# Patient Record
Sex: FEMALE | Race: WHITE | Employment: OTHER | URBAN - METROPOLITAN AREA
[De-identification: names, ages, dates, MRNs, and addresses within clinical notes are randomized per-mention and may not be internally consistent; named-entity substitution may affect disease eponyms.]

---

## 2019-07-15 LAB
CHOLESTEROL, TOTAL: NORMAL
CHOLESTEROL/HDL RATIO: NORMAL
HDLC SERPL-MCNC: NORMAL MG/DL
LDL CHOLESTEROL CALCULATED: NORMAL
TRIGL SERPL-MCNC: NORMAL MG/DL
VITAMIN D 25-HYDROXY: NORMAL
VITAMIN D2, 25 HYDROXY: NORMAL
VITAMIN D3,25 HYDROXY: NORMAL
VLDLC SERPL CALC-MCNC: NORMAL MG/DL

## 2019-08-12 LAB
ALBUMIN SERPL-MCNC: NORMAL G/DL
ALP BLD-CCNC: NORMAL U/L
ALT SERPL-CCNC: NORMAL U/L
ANION GAP SERPL CALCULATED.3IONS-SCNC: NORMAL MMOL/L
AST SERPL-CCNC: NORMAL U/L
BASOPHILS ABSOLUTE: NORMAL
BASOPHILS RELATIVE PERCENT: NORMAL
BILIRUB SERPL-MCNC: NORMAL MG/DL
BILIRUBIN, URINE: NORMAL
BLOOD, URINE: NORMAL
BUN BLDV-MCNC: NORMAL MG/DL
CALCIUM SERPL-MCNC: NORMAL MG/DL
CHLORIDE BLD-SCNC: NORMAL MMOL/L
CLARITY: NORMAL
CO2: NORMAL
COLOR: NORMAL
CREAT SERPL-MCNC: NORMAL MG/DL
EOSINOPHILS ABSOLUTE: NORMAL
EOSINOPHILS RELATIVE PERCENT: NORMAL
GFR CALCULATED: NORMAL
GLUCOSE BLD-MCNC: NORMAL MG/DL
GLUCOSE URINE: NORMAL
HCT VFR BLD CALC: NORMAL %
HEMOGLOBIN: NORMAL
KETONES, URINE: NORMAL
LEUKOCYTE ESTERASE, URINE: NORMAL
LYMPHOCYTES ABSOLUTE: NORMAL
LYMPHOCYTES RELATIVE PERCENT: NORMAL
MCH RBC QN AUTO: NORMAL PG
MCHC RBC AUTO-ENTMCNC: NORMAL G/DL
MCV RBC AUTO: NORMAL FL
MONOCYTES ABSOLUTE: NORMAL
MONOCYTES RELATIVE PERCENT: NORMAL
NEUTROPHILS ABSOLUTE: NORMAL
NEUTROPHILS RELATIVE PERCENT: NORMAL
NITRITE, URINE: NORMAL
PH UA: NORMAL
PLATELET # BLD: NORMAL 10*3/UL
PMV BLD AUTO: NORMAL FL
POTASSIUM SERPL-SCNC: NORMAL MMOL/L
PROTEIN UA: NORMAL
RBC # BLD: NORMAL 10*6/UL
SODIUM BLD-SCNC: NORMAL MMOL/L
SPECIFIC GRAVITY, URINE: NORMAL
T4 FREE: NORMAL
TOTAL PROTEIN: NORMAL
UROBILINOGEN, URINE: NORMAL
WBC # BLD: NORMAL 10*3/UL

## 2019-08-13 LAB — TSH SERPL DL<=0.05 MIU/L-ACNC: NORMAL M[IU]/L

## 2019-08-19 ENCOUNTER — TELEPHONE (OUTPATIENT)
Dept: HEMATOLOGY | Age: 79
End: 2019-08-19

## 2019-08-19 DIAGNOSIS — C78.7 METASTATIC CANCER TO LIVER (HCC): Primary | ICD-10-CM

## 2019-08-19 RX ORDER — SODIUM CHLORIDE 0.9 % (FLUSH) 0.9 %
10 SYRINGE (ML) INJECTION PRN
Status: CANCELLED | OUTPATIENT
Start: 2019-08-19

## 2019-08-20 ENCOUNTER — HOSPITAL ENCOUNTER (OUTPATIENT)
Dept: CT IMAGING | Age: 79
Discharge: HOME | End: 2019-08-22
Payer: MEDICARE

## 2019-08-20 VITALS
HEIGHT: 66 IN | TEMPERATURE: 97.5 F | DIASTOLIC BLOOD PRESSURE: 74 MMHG | OXYGEN SATURATION: 97 % | BODY MASS INDEX: 27.32 KG/M2 | SYSTOLIC BLOOD PRESSURE: 128 MMHG | RESPIRATION RATE: 16 BRPM | WEIGHT: 170 LBS | HEART RATE: 76 BPM

## 2019-08-20 DIAGNOSIS — C78.7 METASTATIC CANCER TO LIVER (HCC): ICD-10-CM

## 2019-08-20 LAB
HCT VFR BLD CALC: 39 % (ref 34–48)
HEMOGLOBIN: 12.4 G/DL (ref 11.5–15.5)
INR BLD: 1.1
MCH RBC QN AUTO: 30.2 PG (ref 26–35)
MCHC RBC AUTO-ENTMCNC: 31.8 % (ref 32–34.5)
MCV RBC AUTO: 94.9 FL (ref 80–99.9)
PDW BLD-RTO: 14 FL (ref 11.5–15)
PLATELET # BLD: 187 E9/L (ref 130–450)
PMV BLD AUTO: 10.3 FL (ref 7–12)
PROTHROMBIN TIME: 12.4 SEC (ref 9.3–12.4)
RBC # BLD: 4.11 E12/L (ref 3.5–5.5)
REASON FOR REJECTION: NORMAL
REJECTED TEST: NORMAL
WBC # BLD: 6 E9/L (ref 4.5–11.5)

## 2019-08-20 PROCEDURE — 88342 IMHCHEM/IMCYTCHM 1ST ANTB: CPT

## 2019-08-20 PROCEDURE — 6370000000 HC RX 637 (ALT 250 FOR IP): Performed by: RADIOLOGY

## 2019-08-20 PROCEDURE — 2709999900 CT GUIDED NEEDLE PLACEMENT

## 2019-08-20 PROCEDURE — 85610 PROTHROMBIN TIME: CPT

## 2019-08-20 PROCEDURE — 88341 IMHCHEM/IMCYTCHM EA ADD ANTB: CPT

## 2019-08-20 PROCEDURE — 85027 COMPLETE CBC AUTOMATED: CPT

## 2019-08-20 PROCEDURE — 2500000003 HC RX 250 WO HCPCS: Performed by: RADIOLOGY

## 2019-08-20 PROCEDURE — 36415 COLL VENOUS BLD VENIPUNCTURE: CPT

## 2019-08-20 PROCEDURE — 7100000010 HC PHASE II RECOVERY - FIRST 15 MIN

## 2019-08-20 PROCEDURE — 47000 NEEDLE BIOPSY OF LIVER PERQ: CPT

## 2019-08-20 PROCEDURE — 7100000011 HC PHASE II RECOVERY - ADDTL 15 MIN

## 2019-08-20 PROCEDURE — 2709999900 CT NEEDLE BIOPSY LIVER PERCUTANEOUS

## 2019-08-20 PROCEDURE — 6360000002 HC RX W HCPCS: Performed by: RADIOLOGY

## 2019-08-20 PROCEDURE — 88307 TISSUE EXAM BY PATHOLOGIST: CPT

## 2019-08-20 RX ORDER — OXYCODONE HYDROCHLORIDE 5 MG/1
5 TABLET ORAL ONCE
Status: COMPLETED | OUTPATIENT
Start: 2019-08-20 | End: 2019-08-20

## 2019-08-20 RX ORDER — MIDAZOLAM HYDROCHLORIDE 1 MG/ML
1 INJECTION INTRAMUSCULAR; INTRAVENOUS ONCE
Status: COMPLETED | OUTPATIENT
Start: 2019-08-20 | End: 2019-08-20

## 2019-08-20 RX ORDER — SODIUM CHLORIDE 0.9 % (FLUSH) 0.9 %
10 SYRINGE (ML) INJECTION PRN
Status: DISCONTINUED | OUTPATIENT
Start: 2019-08-20 | End: 2019-08-23 | Stop reason: HOSPADM

## 2019-08-20 RX ORDER — FENTANYL CITRATE 50 UG/ML
50 INJECTION, SOLUTION INTRAMUSCULAR; INTRAVENOUS ONCE
Status: COMPLETED | OUTPATIENT
Start: 2019-08-20 | End: 2019-08-20

## 2019-08-20 RX ORDER — LIDOCAINE HYDROCHLORIDE 20 MG/ML
10 INJECTION, SOLUTION INFILTRATION; PERINEURAL ONCE
Status: COMPLETED | OUTPATIENT
Start: 2019-08-20 | End: 2019-08-20

## 2019-08-20 RX ADMIN — FENTANYL CITRATE 50 MCG: 50 INJECTION INTRAMUSCULAR; INTRAVENOUS at 08:40

## 2019-08-20 RX ADMIN — LIDOCAINE HYDROCHLORIDE 10 ML: 20 INJECTION, SOLUTION INFILTRATION; PERINEURAL at 08:43

## 2019-08-20 RX ADMIN — OXYCODONE HYDROCHLORIDE 5 MG: 5 TABLET ORAL at 10:47

## 2019-08-20 RX ADMIN — MIDAZOLAM 1 MG: 1 INJECTION INTRAMUSCULAR; INTRAVENOUS at 08:40

## 2019-08-20 RX ADMIN — Medication: at 08:55

## 2019-08-20 ASSESSMENT — PAIN SCALES - GENERAL
PAINLEVEL_OUTOF10: 8
PAINLEVEL_OUTOF10: 2

## 2019-08-20 ASSESSMENT — PAIN DESCRIPTION - DESCRIPTORS
DESCRIPTORS: CRAMPING
DESCRIPTORS: CONSTANT;CRAMPING
DESCRIPTORS: CONSTANT;CRAMPING

## 2019-08-20 ASSESSMENT — PAIN DESCRIPTION - PAIN TYPE
TYPE: SURGICAL PAIN
TYPE: ACUTE PAIN;SURGICAL PAIN
TYPE: SURGICAL PAIN

## 2019-08-20 ASSESSMENT — PAIN DESCRIPTION - PROGRESSION: CLINICAL_PROGRESSION: NOT CHANGED

## 2019-08-20 ASSESSMENT — PAIN DESCRIPTION - ORIENTATION
ORIENTATION: RIGHT

## 2019-08-20 ASSESSMENT — PAIN DESCRIPTION - LOCATION
LOCATION: ABDOMEN

## 2019-08-20 ASSESSMENT — PAIN DESCRIPTION - FREQUENCY
FREQUENCY: CONTINUOUS

## 2019-08-20 ASSESSMENT — PAIN - FUNCTIONAL ASSESSMENT
PAIN_FUNCTIONAL_ASSESSMENT: 0-10
PAIN_FUNCTIONAL_ASSESSMENT: 0-10

## 2019-08-20 NOTE — PRE SEDATION
Classification:  Class 2 - A normal healthy patient with mild systemic disease    Sedation/ Anesthesia Plan:   intravenous sedation    Medications Planned:   Fentanyl and Versed    Patient is an appropriate candidate for plan of sedation: yes    Electronically signed by Giovanna Slaughter MD on 8/20/2019 at 8:23 AM

## 2019-08-20 NOTE — POST SEDATION
Sedation Post Procedure Note    Patient Name: Lizandro Fairbanks   YOB: 1940  Room/Bed: Room/bed info not found  Medical Record Number: [id number]  Date: 8/20/2019   Time: 8:24 AM         Physicians/Assistants: Tish Smith MD    Procedure Performed:  Liver biopsy    Post-Sedation Vital Signs:    Vitals:    08/20/19 0732 08/20/19 0835 08/20/19 0840 08/20/19 0845   BP: 129/87 122/61 123/61 129/62   Pulse: 82 79 83 82   Resp: 18 18 18 14   Temp: 97.5 °F (36.4 °C)      TempSrc: Oral      SpO2: 98% 97% 100% 100%   Weight: 170 lb (77.1 kg)      Height: 5' 5.5\" (1.664 m)                  Post-Sedation Exam: Stable           Complications: none    Electronically signed by Tish Smith MD on 8/20/2019 at 9:05 AM

## 2019-08-23 ENCOUNTER — OFFICE VISIT (OUTPATIENT)
Dept: HEMATOLOGY | Age: 79
End: 2019-08-23
Payer: MEDICARE

## 2019-08-23 VITALS
SYSTOLIC BLOOD PRESSURE: 139 MMHG | DIASTOLIC BLOOD PRESSURE: 63 MMHG | OXYGEN SATURATION: 100 % | RESPIRATION RATE: 18 BRPM | BODY MASS INDEX: 28.99 KG/M2 | HEART RATE: 94 BPM | WEIGHT: 174 LBS | HEIGHT: 65 IN

## 2019-08-23 DIAGNOSIS — C78.7 METASTATIC CANCER TO LIVER (HCC): ICD-10-CM

## 2019-08-23 DIAGNOSIS — C78.7 METASTATIC MELANOMA TO LIVER (HCC): Primary | ICD-10-CM

## 2019-08-23 PROCEDURE — 4040F PNEUMOC VAC/ADMIN/RCVD: CPT | Performed by: TRANSPLANT SURGERY

## 2019-08-23 PROCEDURE — G8419 CALC BMI OUT NRM PARAM NOF/U: HCPCS | Performed by: TRANSPLANT SURGERY

## 2019-08-23 PROCEDURE — 1090F PRES/ABSN URINE INCON ASSESS: CPT | Performed by: TRANSPLANT SURGERY

## 2019-08-23 PROCEDURE — 1123F ACP DISCUSS/DSCN MKR DOCD: CPT | Performed by: TRANSPLANT SURGERY

## 2019-08-23 PROCEDURE — G8400 PT W/DXA NO RESULTS DOC: HCPCS | Performed by: TRANSPLANT SURGERY

## 2019-08-23 PROCEDURE — 99204 OFFICE O/P NEW MOD 45 MIN: CPT | Performed by: TRANSPLANT SURGERY

## 2019-08-23 PROCEDURE — G8427 DOCREV CUR MEDS BY ELIG CLIN: HCPCS | Performed by: TRANSPLANT SURGERY

## 2019-08-23 PROCEDURE — 1036F TOBACCO NON-USER: CPT | Performed by: TRANSPLANT SURGERY

## 2019-08-23 RX ORDER — PAROXETINE HYDROCHLORIDE 20 MG/1
20 TABLET, FILM COATED ORAL EVERY MORNING
COMMUNITY

## 2019-08-23 NOTE — PROGRESS NOTES
Hepatobiliary and Pancreatic Surgery Attending History and Physical    Patient's Name/Date of Birth: Whit Knott /1940 (64 y.o.)    Date: August 23, 2019     CC: liver masses    HPI:  Patient is a very pleasant and unfortunate [de-identified]year old female whom was having right upper quadrant abdominal pain. She also had elevation in her transaminases. She feels fatigued but has had no weight loss. She does have a history breast cancer well over 15 years ago. She denies a family history of cancer. She denies ever having melanoma of her skin. She does spend a lot of time in the sun. She underwent a CT scan which shows about 60% of her liver being tumor. Most of it is concentrated in her left lobe, she does have a right lobe segment 6 mass as well. She had a CT guided biopsy and is seeing me in followup. Past Medical History:   Diagnosis Date    Hypothyroidism     Rectal bleeding approx 6/30/14 started     light    SOB (shortness of breath)        Past Surgical History:   Procedure Laterality Date    BREAST SURGERY Left approximately 1999    lumpectomy    COLONOSCOPY  2006    COLONOSCOPY  7/8/2014       Current Outpatient Medications   Medication Sig Dispense Refill    PARoxetine (PAXIL) 20 MG tablet Take 20 mg by mouth every morning      Cholecalciferol (VITAMIN D PO) Take by mouth      Acetaminophen (TYLENOL ARTHRITIS PAIN PO) Take 2 tablets by mouth as needed.  levothyroxine (SYNTHROID) 100 MCG tablet Take 100 mcg by mouth Daily. No current facility-administered medications for this visit.         No Known Allergies    Family History   Problem Relation Age of Onset    Cancer Father         liver    Cancer Brother     Cancer Brother        Social History     Socioeconomic History    Marital status: Single     Spouse name: Not on file    Number of children: Not on file    Years of education: Not on file    Highest education level: Not on file   Occupational History    Not on file

## 2019-08-26 ASSESSMENT — ENCOUNTER SYMPTOMS
EYE DISCHARGE: 0
CONSTIPATION: 0
DIARRHEA: 0
ABDOMINAL PAIN: 1
VOMITING: 0
SHORTNESS OF BREATH: 0
PHOTOPHOBIA: 0
NAUSEA: 0
EYE PAIN: 0
BLOOD IN STOOL: 0
BACK PAIN: 0

## 2019-08-31 ENCOUNTER — APPOINTMENT (OUTPATIENT)
Dept: CT IMAGING | Age: 79
DRG: 189 | End: 2019-08-31
Payer: MEDICARE

## 2019-08-31 ENCOUNTER — APPOINTMENT (OUTPATIENT)
Dept: GENERAL RADIOLOGY | Age: 79
DRG: 189 | End: 2019-08-31
Payer: MEDICARE

## 2019-08-31 ENCOUNTER — HOSPITAL ENCOUNTER (INPATIENT)
Age: 79
LOS: 4 days | Discharge: HOME | DRG: 189 | End: 2019-09-04
Attending: EMERGENCY MEDICINE | Admitting: INTERNAL MEDICINE
Payer: MEDICARE

## 2019-08-31 DIAGNOSIS — R50.81 FEVER IN OTHER DISEASES: ICD-10-CM

## 2019-08-31 DIAGNOSIS — D59.6: ICD-10-CM

## 2019-08-31 DIAGNOSIS — R09.02 HYPOXIA: Primary | ICD-10-CM

## 2019-08-31 DIAGNOSIS — J18.9 PNEUMONIA DUE TO ORGANISM: ICD-10-CM

## 2019-08-31 LAB
ALBUMIN SERPL-MCNC: 3.2 G/DL (ref 3.5–5.2)
ALP BLD-CCNC: 378 U/L (ref 35–104)
ALT SERPL-CCNC: 56 U/L (ref 0–32)
ANION GAP SERPL CALCULATED.3IONS-SCNC: 15 MMOL/L (ref 7–16)
AST SERPL-CCNC: 170 U/L (ref 0–31)
BACTERIA: NORMAL /HPF
BASOPHILS ABSOLUTE: 0.01 E9/L (ref 0–0.2)
BASOPHILS RELATIVE PERCENT: 0.1 % (ref 0–2)
BILIRUB SERPL-MCNC: 2 MG/DL (ref 0–1.2)
BILIRUBIN URINE: ABNORMAL
BLOOD, URINE: NEGATIVE
BUN BLDV-MCNC: 13 MG/DL (ref 8–23)
CALCIUM SERPL-MCNC: 7.8 MG/DL (ref 8.6–10.2)
CHLORIDE BLD-SCNC: 101 MMOL/L (ref 98–107)
CLARITY: CLEAR
CO2: 22 MMOL/L (ref 22–29)
COLOR: ABNORMAL
CREAT SERPL-MCNC: 1 MG/DL (ref 0.5–1)
EOSINOPHILS ABSOLUTE: 0 E9/L (ref 0.05–0.5)
EOSINOPHILS RELATIVE PERCENT: 0 % (ref 0–6)
EPITHELIAL CELLS, UA: NORMAL /HPF
GFR AFRICAN AMERICAN: >60
GFR NON-AFRICAN AMERICAN: 53 ML/MIN/1.73
GLUCOSE BLD-MCNC: 88 MG/DL (ref 74–99)
GLUCOSE URINE: NEGATIVE MG/DL
HCT VFR BLD CALC: 34.6 % (ref 34–48)
HEMOGLOBIN: 10.7 G/DL (ref 11.5–15.5)
IMMATURE GRANULOCYTES #: 0.03 E9/L
IMMATURE GRANULOCYTES %: 0.4 % (ref 0–5)
KETONES, URINE: ABNORMAL MG/DL
LEUKOCYTE ESTERASE, URINE: NEGATIVE
LYMPHOCYTES ABSOLUTE: 0.75 E9/L (ref 1.5–4)
LYMPHOCYTES RELATIVE PERCENT: 11.1 % (ref 20–42)
MCH RBC QN AUTO: 30 PG (ref 26–35)
MCHC RBC AUTO-ENTMCNC: 30.9 % (ref 32–34.5)
MCV RBC AUTO: 96.9 FL (ref 80–99.9)
MONOCYTES ABSOLUTE: 0.84 E9/L (ref 0.1–0.95)
MONOCYTES RELATIVE PERCENT: 12.5 % (ref 2–12)
NEUTROPHILS ABSOLUTE: 5.1 E9/L (ref 1.8–7.3)
NEUTROPHILS RELATIVE PERCENT: 75.9 % (ref 43–80)
NITRITE, URINE: NEGATIVE
PDW BLD-RTO: 15.6 FL (ref 11.5–15)
PH UA: 5 (ref 5–9)
PLATELET # BLD: 113 E9/L (ref 130–450)
PMV BLD AUTO: 10.5 FL (ref 7–12)
POTASSIUM REFLEX MAGNESIUM: 4.4 MMOL/L (ref 3.5–5)
PRO-BNP: 603 PG/ML (ref 0–450)
PROTEIN UA: ABNORMAL MG/DL
RBC # BLD: 3.57 E12/L (ref 3.5–5.5)
RBC UA: NORMAL /HPF (ref 0–2)
SODIUM BLD-SCNC: 138 MMOL/L (ref 132–146)
SPECIFIC GRAVITY UA: 1.01 (ref 1–1.03)
TOTAL PROTEIN: 6.1 G/DL (ref 6.4–8.3)
TROPONIN: <0.01 NG/ML (ref 0–0.03)
UROBILINOGEN, URINE: 0.2 E.U./DL
WBC # BLD: 6.7 E9/L (ref 4.5–11.5)
WBC UA: NORMAL /HPF (ref 0–5)

## 2019-08-31 PROCEDURE — 71045 X-RAY EXAM CHEST 1 VIEW: CPT

## 2019-08-31 PROCEDURE — 93005 ELECTROCARDIOGRAM TRACING: CPT | Performed by: EMERGENCY MEDICINE

## 2019-08-31 PROCEDURE — 84100 ASSAY OF PHOSPHORUS: CPT

## 2019-08-31 PROCEDURE — 85025 COMPLETE CBC W/AUTO DIFF WBC: CPT

## 2019-08-31 PROCEDURE — 2580000003 HC RX 258: Performed by: EMERGENCY MEDICINE

## 2019-08-31 PROCEDURE — 6370000000 HC RX 637 (ALT 250 FOR IP): Performed by: EMERGENCY MEDICINE

## 2019-08-31 PROCEDURE — 87040 BLOOD CULTURE FOR BACTERIA: CPT

## 2019-08-31 PROCEDURE — 6360000004 HC RX CONTRAST MEDICATION: Performed by: RADIOLOGY

## 2019-08-31 PROCEDURE — 80053 COMPREHEN METABOLIC PANEL: CPT

## 2019-08-31 PROCEDURE — 82550 ASSAY OF CK (CPK): CPT

## 2019-08-31 PROCEDURE — 71275 CT ANGIOGRAPHY CHEST: CPT

## 2019-08-31 PROCEDURE — 96374 THER/PROPH/DIAG INJ IV PUSH: CPT

## 2019-08-31 PROCEDURE — 87088 URINE BACTERIA CULTURE: CPT

## 2019-08-31 PROCEDURE — 81001 URINALYSIS AUTO W/SCOPE: CPT

## 2019-08-31 PROCEDURE — 83880 ASSAY OF NATRIURETIC PEPTIDE: CPT

## 2019-08-31 PROCEDURE — 1200000000 HC SEMI PRIVATE

## 2019-08-31 PROCEDURE — 83735 ASSAY OF MAGNESIUM: CPT

## 2019-08-31 PROCEDURE — 99285 EMERGENCY DEPT VISIT HI MDM: CPT

## 2019-08-31 PROCEDURE — 84484 ASSAY OF TROPONIN QUANT: CPT

## 2019-08-31 PROCEDURE — 96375 TX/PRO/DX INJ NEW DRUG ADDON: CPT

## 2019-08-31 PROCEDURE — 84145 PROCALCITONIN (PCT): CPT

## 2019-08-31 PROCEDURE — 99222 1ST HOSP IP/OBS MODERATE 55: CPT | Performed by: INTERNAL MEDICINE

## 2019-08-31 PROCEDURE — 86140 C-REACTIVE PROTEIN: CPT

## 2019-08-31 PROCEDURE — 6360000002 HC RX W HCPCS: Performed by: EMERGENCY MEDICINE

## 2019-08-31 RX ORDER — POTASSIUM CHLORIDE 20 MEQ/1
40 TABLET, EXTENDED RELEASE ORAL PRN
Status: DISCONTINUED | OUTPATIENT
Start: 2019-08-31 | End: 2019-09-04 | Stop reason: HOSPADM

## 2019-08-31 RX ORDER — PAROXETINE HYDROCHLORIDE 20 MG/1
20 TABLET, FILM COATED ORAL EVERY MORNING
Status: DISCONTINUED | OUTPATIENT
Start: 2019-09-01 | End: 2019-09-04 | Stop reason: HOSPADM

## 2019-08-31 RX ORDER — SODIUM CHLORIDE 0.9 % (FLUSH) 0.9 %
10 SYRINGE (ML) INJECTION PRN
Status: DISCONTINUED | OUTPATIENT
Start: 2019-08-31 | End: 2019-09-04 | Stop reason: HOSPADM

## 2019-08-31 RX ORDER — AZITHROMYCIN 250 MG/1
500 TABLET, FILM COATED ORAL ONCE
Status: COMPLETED | OUTPATIENT
Start: 2019-08-31 | End: 2019-08-31

## 2019-08-31 RX ORDER — 0.9 % SODIUM CHLORIDE 0.9 %
1000 INTRAVENOUS SOLUTION INTRAVENOUS ONCE
Status: COMPLETED | OUTPATIENT
Start: 2019-08-31 | End: 2019-08-31

## 2019-08-31 RX ORDER — ACETAMINOPHEN 325 MG/1
650 TABLET ORAL EVERY 4 HOURS PRN
Status: DISCONTINUED | OUTPATIENT
Start: 2019-08-31 | End: 2019-09-01 | Stop reason: SDUPTHER

## 2019-08-31 RX ORDER — MAGNESIUM SULFATE 1 G/100ML
1 INJECTION INTRAVENOUS PRN
Status: DISCONTINUED | OUTPATIENT
Start: 2019-08-31 | End: 2019-09-04 | Stop reason: HOSPADM

## 2019-08-31 RX ORDER — SODIUM CHLORIDE 0.9 % (FLUSH) 0.9 %
10 SYRINGE (ML) INJECTION EVERY 12 HOURS SCHEDULED
Status: DISCONTINUED | OUTPATIENT
Start: 2019-09-01 | End: 2019-09-04 | Stop reason: HOSPADM

## 2019-08-31 RX ORDER — ONDANSETRON 2 MG/ML
4 INJECTION INTRAMUSCULAR; INTRAVENOUS ONCE
Status: COMPLETED | OUTPATIENT
Start: 2019-08-31 | End: 2019-08-31

## 2019-08-31 RX ORDER — POTASSIUM CHLORIDE 7.45 MG/ML
10 INJECTION INTRAVENOUS PRN
Status: DISCONTINUED | OUTPATIENT
Start: 2019-08-31 | End: 2019-09-04 | Stop reason: HOSPADM

## 2019-08-31 RX ORDER — ACETAMINOPHEN 325 MG/1
650 TABLET ORAL EVERY 4 HOURS PRN
Status: DISCONTINUED | OUTPATIENT
Start: 2019-08-31 | End: 2019-09-04 | Stop reason: HOSPADM

## 2019-08-31 RX ORDER — LEVOTHYROXINE SODIUM 88 UG/1
88 TABLET ORAL DAILY
Status: DISCONTINUED | OUTPATIENT
Start: 2019-09-01 | End: 2019-09-04 | Stop reason: HOSPADM

## 2019-08-31 RX ORDER — SODIUM CHLORIDE 0.9 % (FLUSH) 0.9 %
10 SYRINGE (ML) INJECTION EVERY 12 HOURS SCHEDULED
Status: DISCONTINUED | OUTPATIENT
Start: 2019-08-31 | End: 2019-09-03

## 2019-08-31 RX ORDER — ONDANSETRON 2 MG/ML
4 INJECTION INTRAMUSCULAR; INTRAVENOUS EVERY 6 HOURS PRN
Status: DISCONTINUED | OUTPATIENT
Start: 2019-08-31 | End: 2019-09-04 | Stop reason: HOSPADM

## 2019-08-31 RX ADMIN — SODIUM CHLORIDE 1000 ML: 9 INJECTION, SOLUTION INTRAVENOUS at 21:46

## 2019-08-31 RX ADMIN — AZITHROMYCIN 500 MG: 250 TABLET, FILM COATED ORAL at 23:08

## 2019-08-31 RX ADMIN — SODIUM CHLORIDE 1000 ML: 9 INJECTION, SOLUTION INTRAVENOUS at 20:04

## 2019-08-31 RX ADMIN — IOPAMIDOL 80 ML: 755 INJECTION, SOLUTION INTRAVENOUS at 21:03

## 2019-08-31 RX ADMIN — CEFTRIAXONE SODIUM 2 G: 2 INJECTION, POWDER, FOR SOLUTION INTRAMUSCULAR; INTRAVENOUS at 20:30

## 2019-08-31 RX ADMIN — ONDANSETRON 4 MG: 2 INJECTION INTRAMUSCULAR; INTRAVENOUS at 20:31

## 2019-08-31 ASSESSMENT — PAIN DESCRIPTION - PAIN TYPE: TYPE: ACUTE PAIN

## 2019-08-31 ASSESSMENT — PAIN DESCRIPTION - LOCATION: LOCATION: ABDOMEN

## 2019-08-31 ASSESSMENT — PAIN SCALES - GENERAL: PAINLEVEL_OUTOF10: 5

## 2019-09-01 ENCOUNTER — APPOINTMENT (OUTPATIENT)
Dept: CT IMAGING | Age: 79
DRG: 189 | End: 2019-09-01
Payer: MEDICARE

## 2019-09-01 PROBLEM — E03.9 ACQUIRED HYPOTHYROIDISM: Chronic | Status: ACTIVE | Noted: 2019-09-01

## 2019-09-01 PROBLEM — Z85.89 HISTORY OF KNOWN METASTASIS TO LIVER: Status: ACTIVE | Noted: 2019-09-01

## 2019-09-01 PROBLEM — Z85.89 HISTORY OF KNOWN METASTASIS TO LIVER: Chronic | Status: ACTIVE | Noted: 2019-09-01

## 2019-09-01 PROBLEM — B17.9 ACUTE HEPATITIS: Status: ACTIVE | Noted: 2019-09-01

## 2019-09-01 LAB
ALBUMIN SERPL-MCNC: 2.8 G/DL (ref 3.5–5.2)
ALP BLD-CCNC: 332 U/L (ref 35–104)
ALT SERPL-CCNC: 51 U/L (ref 0–32)
ANION GAP SERPL CALCULATED.3IONS-SCNC: 13 MMOL/L (ref 7–16)
AST SERPL-CCNC: 152 U/L (ref 0–31)
BASOPHILS ABSOLUTE: 0.01 E9/L (ref 0–0.2)
BASOPHILS RELATIVE PERCENT: 0.1 % (ref 0–2)
BILIRUB SERPL-MCNC: 1.7 MG/DL (ref 0–1.2)
BILIRUBIN DIRECT: 1.7 MG/DL (ref 0–0.3)
BILIRUBIN, INDIRECT: 0 MG/DL (ref 0–1)
BUN BLDV-MCNC: 12 MG/DL (ref 8–23)
C-REACTIVE PROTEIN: 7.9 MG/DL (ref 0–0.4)
CALCIUM SERPL-MCNC: 7.2 MG/DL (ref 8.6–10.2)
CHLORIDE BLD-SCNC: 107 MMOL/L (ref 98–107)
CO2: 19 MMOL/L (ref 22–29)
CREAT SERPL-MCNC: 0.8 MG/DL (ref 0.5–1)
EOSINOPHILS ABSOLUTE: 0 E9/L (ref 0.05–0.5)
EOSINOPHILS RELATIVE PERCENT: 0 % (ref 0–6)
FERRITIN: 598 NG/ML
FILM ARRAY ADENOVIRUS: NORMAL
FILM ARRAY BORDETELLA PERTUSSIS: NORMAL
FILM ARRAY CHLAMYDOPHILIA PNEUMONIAE: NORMAL
FILM ARRAY CORONAVIRUS 229E: NORMAL
FILM ARRAY CORONAVIRUS HKU1: NORMAL
FILM ARRAY CORONAVIRUS NL63: NORMAL
FILM ARRAY CORONAVIRUS OC43: NORMAL
FILM ARRAY INFLUENZA A VIRUS 09H1: NORMAL
FILM ARRAY INFLUENZA A VIRUS H1: NORMAL
FILM ARRAY INFLUENZA A VIRUS H3: NORMAL
FILM ARRAY INFLUENZA A VIRUS: NORMAL
FILM ARRAY INFLUENZA B: NORMAL
FILM ARRAY METAPNEUMOVIRUS: NORMAL
FILM ARRAY MYCOPLASMA PNEUMONIAE: NORMAL
FILM ARRAY PARAINFLUENZA VIRUS 1: NORMAL
FILM ARRAY PARAINFLUENZA VIRUS 2: NORMAL
FILM ARRAY PARAINFLUENZA VIRUS 3: NORMAL
FILM ARRAY PARAINFLUENZA VIRUS 4: NORMAL
FILM ARRAY RESPIRATORY SYNCITIAL VIRUS: NORMAL
FILM ARRAY RHINOVIRUS/ENTEROVIRUS: NORMAL
GFR AFRICAN AMERICAN: >60
GFR NON-AFRICAN AMERICAN: >60 ML/MIN/1.73
GLUCOSE BLD-MCNC: 81 MG/DL (ref 74–99)
HCT VFR BLD CALC: 31.8 % (ref 34–48)
HEMOGLOBIN: 9.7 G/DL (ref 11.5–15.5)
IMMATURE GRANULOCYTES #: 0.05 E9/L
IMMATURE GRANULOCYTES %: 0.7 % (ref 0–5)
IMMATURE RETIC FRACT: 27.9 % (ref 3–15.9)
IRON SATURATION: 23 % (ref 15–50)
IRON: 43 MCG/DL (ref 37–145)
LACTATE DEHYDROGENASE: >2500 U/L (ref 135–214)
LACTIC ACID: 1.8 MMOL/L (ref 0.5–2.2)
LACTIC ACID: 2.5 MMOL/L (ref 0.5–2.2)
LYMPHOCYTES ABSOLUTE: 0.76 E9/L (ref 1.5–4)
LYMPHOCYTES RELATIVE PERCENT: 10.1 % (ref 20–42)
MAGNESIUM: 2 MG/DL (ref 1.6–2.6)
MAGNESIUM: 2.1 MG/DL (ref 1.6–2.6)
MCH RBC QN AUTO: 29.8 PG (ref 26–35)
MCHC RBC AUTO-ENTMCNC: 30.5 % (ref 32–34.5)
MCV RBC AUTO: 97.8 FL (ref 80–99.9)
MONOCYTES ABSOLUTE: 0.82 E9/L (ref 0.1–0.95)
MONOCYTES RELATIVE PERCENT: 10.9 % (ref 2–12)
NEUTROPHILS ABSOLUTE: 5.88 E9/L (ref 1.8–7.3)
NEUTROPHILS RELATIVE PERCENT: 78.2 % (ref 43–80)
PDW BLD-RTO: 15.6 FL (ref 11.5–15)
PHOSPHORUS: 3.4 MG/DL (ref 2.5–4.5)
PLATELET # BLD: 106 E9/L (ref 130–450)
PMV BLD AUTO: 10.2 FL (ref 7–12)
POTASSIUM REFLEX MAGNESIUM: 4.3 MMOL/L (ref 3.5–5)
PROCALCITONIN: 1.12 NG/ML (ref 0–0.08)
RBC # BLD: 3.25 E12/L (ref 3.5–5.5)
RETIC HGB EQUIVALENT: 35.4 PG (ref 28.2–36.6)
RETICULOCYTE ABSOLUTE COUNT: 0.07 E12/L
RETICULOCYTE COUNT PCT: 2.3 % (ref 0.4–1.9)
SODIUM BLD-SCNC: 139 MMOL/L (ref 132–146)
TOTAL CK: 52 U/L (ref 20–180)
TOTAL IRON BINDING CAPACITY: 184 MCG/DL (ref 250–450)
TOTAL PROTEIN: 5.6 G/DL (ref 6.4–8.3)
TROPONIN: <0.01 NG/ML (ref 0–0.03)
TSH SERPL DL<=0.05 MIU/L-ACNC: 3.24 UIU/ML (ref 0.27–4.2)
WBC # BLD: 7.5 E9/L (ref 4.5–11.5)

## 2019-09-01 PROCEDURE — 6360000002 HC RX W HCPCS: Performed by: INTERNAL MEDICINE

## 2019-09-01 PROCEDURE — 36415 COLL VENOUS BLD VENIPUNCTURE: CPT

## 2019-09-01 PROCEDURE — 83550 IRON BINDING TEST: CPT

## 2019-09-01 PROCEDURE — 85045 AUTOMATED RETICULOCYTE COUNT: CPT

## 2019-09-01 PROCEDURE — 84443 ASSAY THYROID STIM HORMONE: CPT

## 2019-09-01 PROCEDURE — 85025 COMPLETE CBC W/AUTO DIFF WBC: CPT

## 2019-09-01 PROCEDURE — 87581 M.PNEUMON DNA AMP PROBE: CPT

## 2019-09-01 PROCEDURE — 87324 CLOSTRIDIUM AG IA: CPT

## 2019-09-01 PROCEDURE — 80076 HEPATIC FUNCTION PANEL: CPT

## 2019-09-01 PROCEDURE — 1200000000 HC SEMI PRIVATE

## 2019-09-01 PROCEDURE — 83605 ASSAY OF LACTIC ACID: CPT

## 2019-09-01 PROCEDURE — 82728 ASSAY OF FERRITIN: CPT

## 2019-09-01 PROCEDURE — APPSS30 APP SPLIT SHARED TIME 16-30 MINUTES: Performed by: NURSE PRACTITIONER

## 2019-09-01 PROCEDURE — 80048 BASIC METABOLIC PNL TOTAL CA: CPT

## 2019-09-01 PROCEDURE — 84484 ASSAY OF TROPONIN QUANT: CPT

## 2019-09-01 PROCEDURE — 6370000000 HC RX 637 (ALT 250 FOR IP): Performed by: INTERNAL MEDICINE

## 2019-09-01 PROCEDURE — 87798 DETECT AGENT NOS DNA AMP: CPT

## 2019-09-01 PROCEDURE — 74176 CT ABD & PELVIS W/O CONTRAST: CPT

## 2019-09-01 PROCEDURE — 80074 ACUTE HEPATITIS PANEL: CPT

## 2019-09-01 PROCEDURE — 99222 1ST HOSP IP/OBS MODERATE 55: CPT | Performed by: NURSE PRACTITIONER

## 2019-09-01 PROCEDURE — 83615 LACTATE (LD) (LDH) ENZYME: CPT

## 2019-09-01 PROCEDURE — 87633 RESP VIRUS 12-25 TARGETS: CPT

## 2019-09-01 PROCEDURE — 87486 CHLMYD PNEUM DNA AMP PROBE: CPT

## 2019-09-01 PROCEDURE — 97116 GAIT TRAINING THERAPY: CPT

## 2019-09-01 PROCEDURE — 97161 PT EVAL LOW COMPLEX 20 MIN: CPT

## 2019-09-01 PROCEDURE — 2580000003 HC RX 258: Performed by: INTERNAL MEDICINE

## 2019-09-01 PROCEDURE — 87449 NOS EACH ORGANISM AG IA: CPT

## 2019-09-01 PROCEDURE — 83735 ASSAY OF MAGNESIUM: CPT

## 2019-09-01 PROCEDURE — 83540 ASSAY OF IRON: CPT

## 2019-09-01 PROCEDURE — 97530 THERAPEUTIC ACTIVITIES: CPT

## 2019-09-01 PROCEDURE — 99233 SBSQ HOSP IP/OBS HIGH 50: CPT | Performed by: INTERNAL MEDICINE

## 2019-09-01 PROCEDURE — 2700000000 HC OXYGEN THERAPY PER DAY

## 2019-09-01 RX ORDER — PANTOPRAZOLE SODIUM 40 MG/1
40 TABLET, DELAYED RELEASE ORAL
Status: DISCONTINUED | OUTPATIENT
Start: 2019-09-02 | End: 2019-09-04 | Stop reason: HOSPADM

## 2019-09-01 RX ORDER — DEXAMETHASONE SODIUM PHOSPHATE 4 MG/ML
8 INJECTION, SOLUTION INTRA-ARTICULAR; INTRALESIONAL; INTRAMUSCULAR; INTRAVENOUS; SOFT TISSUE DAILY
Status: DISCONTINUED | OUTPATIENT
Start: 2019-09-01 | End: 2019-09-04 | Stop reason: HOSPADM

## 2019-09-01 RX ORDER — OXYCODONE HYDROCHLORIDE AND ACETAMINOPHEN 5; 325 MG/1; MG/1
1 TABLET ORAL EVERY 4 HOURS PRN
Status: DISCONTINUED | OUTPATIENT
Start: 2019-09-01 | End: 2019-09-04 | Stop reason: HOSPADM

## 2019-09-01 RX ORDER — 0.9 % SODIUM CHLORIDE 0.9 %
500 INTRAVENOUS SOLUTION INTRAVENOUS ONCE
Status: COMPLETED | OUTPATIENT
Start: 2019-09-01 | End: 2019-09-01

## 2019-09-01 RX ORDER — DEXTROSE AND SODIUM CHLORIDE 5; .9 G/100ML; G/100ML
INJECTION, SOLUTION INTRAVENOUS CONTINUOUS
Status: DISCONTINUED | OUTPATIENT
Start: 2019-09-01 | End: 2019-09-01

## 2019-09-01 RX ORDER — SODIUM CHLORIDE 9 MG/ML
INJECTION, SOLUTION INTRAVENOUS CONTINUOUS
Status: ACTIVE | OUTPATIENT
Start: 2019-09-01 | End: 2019-09-01

## 2019-09-01 RX ORDER — AZITHROMYCIN 250 MG/1
250 TABLET, FILM COATED ORAL DAILY
Status: DISCONTINUED | OUTPATIENT
Start: 2019-09-01 | End: 2019-09-03

## 2019-09-01 RX ADMIN — Medication 10 ML: at 00:03

## 2019-09-01 RX ADMIN — SODIUM CHLORIDE: 9 INJECTION, SOLUTION INTRAVENOUS at 09:35

## 2019-09-01 RX ADMIN — SODIUM CHLORIDE 500 ML: 9 INJECTION, SOLUTION INTRAVENOUS at 01:01

## 2019-09-01 RX ADMIN — ONDANSETRON 4 MG: 2 INJECTION INTRAMUSCULAR; INTRAVENOUS at 09:36

## 2019-09-01 RX ADMIN — SODIUM CHLORIDE: 9 INJECTION, SOLUTION INTRAVENOUS at 01:40

## 2019-09-01 RX ADMIN — Medication 10 ML: at 21:51

## 2019-09-01 RX ADMIN — DEXAMETHASONE SODIUM PHOSPHATE 8 MG: 4 INJECTION, SOLUTION INTRAMUSCULAR; INTRAVENOUS at 12:39

## 2019-09-01 RX ADMIN — CEFTRIAXONE 1 G: 1 INJECTION, POWDER, FOR SOLUTION INTRAMUSCULAR; INTRAVENOUS at 21:50

## 2019-09-01 RX ADMIN — AZITHROMYCIN 250 MG: 250 TABLET, FILM COATED ORAL at 21:50

## 2019-09-01 ASSESSMENT — PAIN SCALES - GENERAL
PAINLEVEL_OUTOF10: 0
PAINLEVEL_OUTOF10: 0

## 2019-09-01 NOTE — PROGRESS NOTES
edema  Neurologic:speech normal         Recent Labs     08/31/19 1942 09/01/19  0455    139   K 4.4 4.3    107   CO2 22 19*   BUN 13 12   CREATININE 1.0 0.8   GLUCOSE 88 81   CALCIUM 7.8* 7.2*       Recent Labs     08/31/19 1942 09/01/19  0455   WBC 6.7 7.5   RBC 3.57 3.25*   HGB 10.7* 9.7*   HCT 34.6 31.8*   MCV 96.9 97.8   MCH 30.0 29.8   MCHC 30.9* 30.5*   RDW 15.6* 15.6*   * 106*   MPV 10.5 10.2         Assessment:    Principal Problem:    Acute hepatitis  Active Problems:    Pneumonia    History of known metastasis to liver    Acquired hypothyroidism  Resolved Problems:    * No resolved hospital problems. *      Plan:  1. Acute respiratory failure with hypoxia: noted that patient was hypoxic 79% on RA in ER in [place]. Most likely related to pneumonia. Currently on RA. Monitor pulse ox. 2. Pneumonia: reviewed CT imaging with attending. Significant hepatomegaly causing right lung to be pushed up. Pro-lindy elevated. Hypoxic. Denies cough. Reporting sob. Resp panel negative. Continue IV antibiotics. Monitor pulse ox. 3. Recent diagnosis of lmetastatic melanoma to liver: Hem/ onc consulted- appreciate input. Working on treatment options. Started on decadron. Percocet started. Hepatitis panel pending. Hepatitis panel pending. 4. N/ V/ Diarrhea;started on decadron. 5. H/o breast cancer     NOTE: This report was transcribed using voice recognition software. Every effort was made to ensure accuracy; however, inadvertent computerized transcription errors may be present.   Electronically signed by MARGOT Martinez on 9/1/2019 at 1:40 PM

## 2019-09-01 NOTE — PLAN OF CARE
Problem: Pain:  Goal: Pain level will decrease  Description  Pain level will decrease  9/1/2019 1046 by Segun Brown RN  Outcome: Met This Shift  9/1/2019 0554 by Dennis Montes RN  Outcome: Not Met This Shift  Goal: Control of acute pain  Description  Control of acute pain  9/1/2019 1046 by Segun Brown RN  Outcome: Met This Shift  9/1/2019 0554 by Dennis Montes RN  Outcome: Not Met This Shift  Goal: Control of chronic pain  Description  Control of chronic pain  9/1/2019 1046 by Segun Brown RN  Outcome: Met This Shift  9/1/2019 0554 by Dennis Montes RN  Outcome: Met This Shift     Problem: Gas Exchange - Impaired:  Goal: Levels of oxygenation will improve  Description  Levels of oxygenation will improve  9/1/2019 1046 by Segun Brown RN  Outcome: Met This Shift  9/1/2019 0554 by Dennis Montes RN  Outcome: Met This Shift     Problem: Breathing Pattern - Ineffective:  Goal: Ability to achieve and maintain a regular respiratory rate will improve  Description  Ability to achieve and maintain a regular respiratory rate will improve  9/1/2019 1046 by Segun Brown RN  Outcome: Met This Shift  9/1/2019 0554 by Dennis Montes RN  Outcome: Met This Shift

## 2019-09-01 NOTE — PROGRESS NOTES
Pt states she is nauseous, but does not want any zofran at this time. Will continue to monitor patient.

## 2019-09-01 NOTE — H&P
Liver cancer ([facility] [address]     Rectal bleeding approx 6/30/14 started     light    SOB (shortness of breath)        Surgical History:  Past Surgical History:   Procedure Laterality Date    BREAST SURGERY Left approximately 1999    lumpectomy    COLONOSCOPY  2006    COLONOSCOPY  7/8/2014       Medications Prior to Admission:    Prior to Admission medications    Medication Sig Start Date End Date Taking? Authorizing Provider   PARoxetine (PAXIL) 20 MG tablet Take 20 mg by mouth every morning   Yes Historical Provider, MD   levothyroxine (SYNTHROID) 100 MCG tablet Take 88 mcg by mouth Daily    Yes Historical Provider, MD   Cholecalciferol (VITAMIN D PO) Take by mouth    Historical Provider, MD   Acetaminophen (TYLENOL ARTHRITIS PAIN PO) Take 2 tablets by mouth as needed. Historical Provider, MD       Allergies:    Patient has no known allergies. Social History:    reports that she has never smoked. She has never used smokeless tobacco. She reports that she does not drink alcohol or use drugs. Family History:   family history includes Cancer in her brother, brother, and father.        PHYSICAL EXAM:  Vitals:  BP (!) 110/56   Pulse 85   Temp 98.1 °F (36.7 °C) (Oral)   Resp 16   Ht 5' 5.5\" (1.664 m)   Wt 175 lb (79.4 kg)   SpO2 96%   BMI 28.68 kg/m²   General Appearance: alert and oriented to person, place and time and in no acute distress  Skin: warm and dry  Head: normocephalic and atraumatic  Eyes: pupils equal, round, and reactive to light, extraocular eye movements intact, conjunctivae normal  Neck: neck supple and non tender without mass   Pulmonary/Chest: clear to auscultation bilaterally- no wheezes, rales or rhonchi, normal air movement, no respiratory distress  Cardiovascular: normal rate, normal S1 and S2 and no carotid bruits  Abdomen: soft, mild tenderness, mildly distended, no rebound, no rigidity, normal bowel sounds  Extremities: no cyanosis, no clubbing and no edema  Neurologic: no cranial nerve deficit and speech normal    LABS:  CBC  Recent Labs     08/31/19 1942 09/01/19  0455   WBC 6.7 7.5   HGB 10.7* 9.7*   HCT 34.6 31.8*   MCV 96.9 97.8   * 106*     BMP  Recent Labs     08/31/19 1942 09/01/19  0455    139   K 4.4 4.3    107   CO2 22 19*   BUN 13 12   CREATININE 1.0 0.8   LABGLOM 53 >60   GLUCOSE 88 81   CALCIUM 7.8* 7.2*     Lab Results   Component Value Date    MG 2.0 09/01/2019    PHOS 3.4 08/31/2019     LFT  Recent Labs     08/31/19 1942 09/01/19 0455   ALT 56* 51*   * 152*   ALKPHOS 378* 332*   BILITOT 2.0* 1.7*   BILIDIR  --  1.7*     Albumin  Lab Results   Component Value Date    LABALBU 2.8 (L) 09/01/2019    LABALBU 3.2 (L) 08/31/2019    LABALBU 4.2 01/18/2018     Lactic acid   No results for input(s): LACTSEPSIS in the last 72 hours. Cardiac  Troponin   Lab Results   Component Value Date    TROPONINI <0.01 09/01/2019    TROPONINI <0.01 08/31/2019     Pro-BNP   Lab Results   Component Value Date    PROBNP 603 (H) 08/31/2019     Iron studies  Lab Results   Component Value Date    FERRITIN 598 09/01/2019    IRON 43 09/01/2019    TIBC 184 (L) 09/01/2019       Radiology:   CT ABDOMEN PELVIS WO CONTRAST Additional Contrast? None   Final Result   1. There is at least one large liver mass within segment 8 measuring about 9    cm, seen best on the prior outside exam. The differential diagnosis includes    primary hepatocellular carcinoma versus metastatic disease. The large increased    attenuation mass may be related to focal nodular hyperplasia. MRI imaging of    the abdomen using liver hemangioma is recommended. 2. Stable left ovarian cyst. Interval increase in the amount of radiodense    fluid in the pelvis, likely related to hemorrhage. 3. Hepatomegaly redemonstrated. There is fatty infiltration seen involving a    relatively normal liver.  The prior exam demonstrates a least 2 nodules,    possibly related to regenerating nodules versus FNH involving

## 2019-09-01 NOTE — CONSULTS
Department of Medicine  Division of Hematology/Oncology  Attending Consult Note      Reason for Consult:    Metastatic melanoma to the liver  Worsening dyspnea  Nausea and vomiting        HISTORY OF PRESENT ILLNESS:      Patient is an 70-year-old woman who was recently diagnosed with metastatic melanoma to the liver admitted to the hospital for worsening fatigue, nausea and vomiting as well as dyspnea. She said her symptoms just started few weeks ago. She has been experiencing nausea and vomiting, night sweats and progressive fatigue. She was follow routine blood work with abnormal liver enzymes. She reported 10 pounds weight loss over the last month. She has decreased appetite. CT scan of the abdomen showed significant amount of tumor involving the liver. CT-guided liver biopsy 8/20/2019 showed the presence of metastatic melanoma with spindle cell features diffusely positive for MART1 and S100 while negative for pankeratin. Fatigue, nausea and vomiting worse significantly over the last week. And now she is experiencing dyspnea on minimal exertion. She has mild cough. Not producing any phlegm. No hemoptysis. She was found to be febrile in the emergency room at 101. CT of the chest done yesterday showed no significant evidence of pulmonary embolism. Opacities were seen within the inferior aspect of the right middle lobe, most likely representing subsegmental atelectasis. CT scan of the abdomen and pelvis done earlier this morning showed one large liver mass within segment 8 measuring 9 cm. Hepatomegaly with fatty infiltration readdress some wall thickening of the descending and transverse colon. Patient also has been experiencing over the last couple weeks of frequent loose stools. She had at least 4 bowel movements over the last 12 hours. She has abdominal cramps. She has abdominal pain off and on in the left upper quadrant. Nausea is slightly improved today with the Zofran.   No recent

## 2019-09-01 NOTE — CONSULTS
Code status and goals of care. Assessment/Plan   1. Continue current treatment  2. Oncology- IV steroid dexamethasone 8 mg daily, obtain BRAF mutation status, Also brain MRI is required to  rule out possible brain metastases frequently seen in patients with liver melanoma. Patient does not want additional imaging at this time. 3. PT consulted  4. Palliative for goals and code status. DNR-CCA NO INTUBATION       Janice Granados APRN-CNP, AGACNP-BC  Palliative Medicine    Discussed patient and the plan of care with the other IDT members of Palliative Care, and with consultants, Primary Attending, patient, family and floor nurse. Thank you for allowing Palliative Medicine to participate in the care of Pura Christy. Note: This report was completed using computer[employer] voiced recognition software. Every effort has been made to ensure accuracy; however, inadvertent computerized transcription errors may be present.

## 2019-09-02 PROBLEM — C43.9 MALIGNANT MELANOMA (HCC): Status: ACTIVE | Noted: 2019-09-02

## 2019-09-02 LAB
ALBUMIN SERPL-MCNC: 2.9 G/DL (ref 3.5–5.2)
ALP BLD-CCNC: 324 U/L (ref 35–104)
ALT SERPL-CCNC: 50 U/L (ref 0–32)
ANION GAP SERPL CALCULATED.3IONS-SCNC: 13 MMOL/L (ref 7–16)
AST SERPL-CCNC: 145 U/L (ref 0–31)
BASOPHILS ABSOLUTE: 0 E9/L (ref 0–0.2)
BASOPHILS RELATIVE PERCENT: 0 % (ref 0–2)
BILIRUB SERPL-MCNC: 1.2 MG/DL (ref 0–1.2)
BILIRUBIN DIRECT: 1.1 MG/DL (ref 0–0.3)
BILIRUBIN, INDIRECT: 0.1 MG/DL (ref 0–1)
BUN BLDV-MCNC: 13 MG/DL (ref 8–23)
C DIFF TOXIN/ANTIGEN: NORMAL
CALCIUM SERPL-MCNC: 7.6 MG/DL (ref 8.6–10.2)
CHLORIDE BLD-SCNC: 105 MMOL/L (ref 98–107)
CO2: 19 MMOL/L (ref 22–29)
CREAT SERPL-MCNC: 0.7 MG/DL (ref 0.5–1)
EKG ATRIAL RATE: 92 BPM
EKG P AXIS: 10 DEGREES
EKG P-R INTERVAL: 136 MS
EKG Q-T INTERVAL: 364 MS
EKG QRS DURATION: 76 MS
EKG QTC CALCULATION (BAZETT): 450 MS
EKG R AXIS: -8 DEGREES
EKG T AXIS: 3 DEGREES
EKG VENTRICULAR RATE: 92 BPM
EOSINOPHILS ABSOLUTE: 0.02 E9/L (ref 0.05–0.5)
EOSINOPHILS RELATIVE PERCENT: 0.3 % (ref 0–6)
GFR AFRICAN AMERICAN: >60
GFR NON-AFRICAN AMERICAN: >60 ML/MIN/1.73
GLUCOSE BLD-MCNC: 78 MG/DL (ref 74–99)
HCT VFR BLD CALC: 31.1 % (ref 34–48)
HEMOGLOBIN: 9.7 G/DL (ref 11.5–15.5)
IMMATURE GRANULOCYTES #: 0.04 E9/L
IMMATURE GRANULOCYTES %: 0.5 % (ref 0–5)
LYMPHOCYTES ABSOLUTE: 1.15 E9/L (ref 1.5–4)
LYMPHOCYTES RELATIVE PERCENT: 15 % (ref 20–42)
MAGNESIUM: 2.2 MG/DL (ref 1.6–2.6)
MCH RBC QN AUTO: 30.4 PG (ref 26–35)
MCHC RBC AUTO-ENTMCNC: 31.2 % (ref 32–34.5)
MCV RBC AUTO: 97.5 FL (ref 80–99.9)
MONOCYTES ABSOLUTE: 0.88 E9/L (ref 0.1–0.95)
MONOCYTES RELATIVE PERCENT: 11.5 % (ref 2–12)
NEUTROPHILS ABSOLUTE: 5.57 E9/L (ref 1.8–7.3)
NEUTROPHILS RELATIVE PERCENT: 72.7 % (ref 43–80)
PDW BLD-RTO: 15.7 FL (ref 11.5–15)
PLATELET # BLD: 120 E9/L (ref 130–450)
PMV BLD AUTO: 10.4 FL (ref 7–12)
POTASSIUM REFLEX MAGNESIUM: 3.9 MMOL/L (ref 3.5–5)
RBC # BLD: 3.19 E12/L (ref 3.5–5.5)
SODIUM BLD-SCNC: 137 MMOL/L (ref 132–146)
TOTAL PROTEIN: 5.6 G/DL (ref 6.4–8.3)
WBC # BLD: 7.7 E9/L (ref 4.5–11.5)

## 2019-09-02 PROCEDURE — 80076 HEPATIC FUNCTION PANEL: CPT

## 2019-09-02 PROCEDURE — 6370000000 HC RX 637 (ALT 250 FOR IP): Performed by: INTERNAL MEDICINE

## 2019-09-02 PROCEDURE — 93010 ELECTROCARDIOGRAM REPORT: CPT | Performed by: INTERNAL MEDICINE

## 2019-09-02 PROCEDURE — 6360000002 HC RX W HCPCS: Performed by: INTERNAL MEDICINE

## 2019-09-02 PROCEDURE — 99232 SBSQ HOSP IP/OBS MODERATE 35: CPT | Performed by: INTERNAL MEDICINE

## 2019-09-02 PROCEDURE — 83735 ASSAY OF MAGNESIUM: CPT

## 2019-09-02 PROCEDURE — 36415 COLL VENOUS BLD VENIPUNCTURE: CPT

## 2019-09-02 PROCEDURE — 85025 COMPLETE CBC W/AUTO DIFF WBC: CPT

## 2019-09-02 PROCEDURE — 1200000000 HC SEMI PRIVATE

## 2019-09-02 PROCEDURE — 2580000003 HC RX 258: Performed by: INTERNAL MEDICINE

## 2019-09-02 PROCEDURE — 80048 BASIC METABOLIC PNL TOTAL CA: CPT

## 2019-09-02 RX ADMIN — PANTOPRAZOLE SODIUM 40 MG: 40 TABLET, DELAYED RELEASE ORAL at 05:28

## 2019-09-02 RX ADMIN — DEXAMETHASONE SODIUM PHOSPHATE 8 MG: 4 INJECTION, SOLUTION INTRAMUSCULAR; INTRAVENOUS at 09:13

## 2019-09-02 RX ADMIN — AZITHROMYCIN 250 MG: 250 TABLET, FILM COATED ORAL at 20:26

## 2019-09-02 RX ADMIN — Medication 10 ML: at 09:13

## 2019-09-02 RX ADMIN — CEFTRIAXONE 1 G: 1 INJECTION, POWDER, FOR SOLUTION INTRAMUSCULAR; INTRAVENOUS at 20:26

## 2019-09-02 RX ADMIN — PAROXETINE HYDROCHLORIDE 20 MG: 20 TABLET, FILM COATED ORAL at 09:13

## 2019-09-02 RX ADMIN — LEVOTHYROXINE SODIUM 88 MCG: 88 TABLET ORAL at 05:28

## 2019-09-02 RX ADMIN — Medication 10 ML: at 20:29

## 2019-09-02 RX ADMIN — ONDANSETRON 4 MG: 2 INJECTION INTRAMUSCULAR; INTRAVENOUS at 09:13

## 2019-09-02 ASSESSMENT — PAIN SCALES - GENERAL
PAINLEVEL_OUTOF10: 0
PAINLEVEL_OUTOF10: 0

## 2019-09-02 NOTE — PLAN OF CARE
Problem: Pain:  Goal: Control of chronic pain  Description  Control of chronic pain  9/2/2019 0015 by Miley De Santiago RN  Outcome: Met This Shift  9/1/2019 1046 by Kunal Ray RN  Outcome: Met This Shift     Problem: Gas Exchange - Impaired:  Goal: Levels of oxygenation will improve  Description  Levels of oxygenation will improve  9/2/2019 0015 by Miley De Santiago RN  Outcome: Met This Shift  9/1/2019 1046 by Kunal Ray RN  Outcome: Met This Shift     Problem: Breathing Pattern - Ineffective:  Goal: Ability to achieve and maintain a regular respiratory rate will improve  Description  Ability to achieve and maintain a regular respiratory rate will improve  9/2/2019 0015 by Miley De Santiago RN  Outcome: Met This Shift  9/1/2019 1046 by Kunal Ray RN  Outcome: Met This Shift     Problem: Pain:  Goal: Pain level will decrease  Description  Pain level will decrease  9/2/2019 0015 by Miley De Santiago RN  Outcome: Not Met This Shift  9/1/2019 1046 by Kunal Ray RN  Outcome: Met This Shift  Goal: Control of acute pain  Description  Control of acute pain  9/2/2019 0015 by Miley De Santiago RN  Outcome: Not Met This Shift  9/1/2019 1046 by Kunal Ray RN  Outcome: Met This Shift

## 2019-09-02 NOTE — PROGRESS NOTES
[facility] Surgery    Patient seen in my office  Metastatic melanoma to her liver  Also seeing Dr. Hansel Soto  Will see patient tomorrow    Electronically signed by Esperanza Stoll MD on 9/2/2019 at 9:31 AM

## 2019-09-02 NOTE — PROGRESS NOTES
[facility]  Hospitalist   Progress Note    Admitting Date and Time: 8/31/2019  7:22 PM  Admit Dx: Pneumonia [J18.9]  Pneumonia [J18.9]     Seen for follow-up on pneumonia, recently diagnosed with metastatic melanoma to liver    Subjective: Increased weakness and fatigue, exertional short of breath, denies any chest pain, nausea vomiting abdominal discomfort and diarrhea is improving, had x2 semisolid bowel movements this a.mAldair Troncoso Aminah Discussed with nursing. ROS: denies fever, chills, cp, sob, HA unless stated above.      cefTRIAXone (ROCEPHIN) IV  1 g Intravenous Q24H    azithromycin  250 mg Oral Daily    dexamethasone  8 mg Intravenous Daily    pantoprazole  40 mg Oral QAM AC    sodium chloride flush  10 mL Intravenous 2 times per day    enoxaparin  40 mg Subcutaneous Daily    levothyroxine  88 mcg Oral Daily    PARoxetine  20 mg Oral QAM    sodium chloride flush  10 mL Intravenous 2 times per day       oxyCODONE-acetaminophen 1 tablet Q4H PRN   sodium chloride flush 10 mL PRN   sodium chloride flush 10 mL PRN   potassium chloride 40 mEq PRN   Or     potassium alternative oral replacement 40 mEq PRN   Or     potassium chloride 10 mEq PRN   magnesium sulfate 1 g PRN   magnesium hydroxide 30 mL Daily PRN   ondansetron 4 mg Q6H PRN   acetaminophen 650 mg Q4H PRN        Objective:    /60   Pulse 92   Temp 98.3 °F (36.8 °C) (Oral)   Resp 16   Ht 5' 5.5\" (1.664 m)   Wt 179 lb (81.2 kg)   SpO2 98%   BMI 29.33 kg/m²   General Appearance: alert and oriented to person, place and time, well developed and well- nourished, in no acute distress  Skin: warm and dry, no rash or erythema  Head: normocephalic and atraumatic  Eyes: pupils equal, round, and reactive to light, extraocular eye movements intact, conjunctivae normal  Neck: supple and non-tender without mass  Pulmonary/Chest: clear to auscultation bilaterally  Cardiovascular: normal rate, regular rhythm, normal S1 and S2  Abdomen: soft, non-tender, non-distended, normal bowel sounds  Extremities: no cyanosis, clubbing  Musculoskeletal: normal range of motion, no joint swelling, deformity or tenderness  Neurologic:no cranial nerve deficit,  speech normal      Recent Labs     08/31/19 1942 09/01/19 0455 09/02/19  0420    139 137   K 4.4 4.3 3.9    107 105   CO2 22 19* 19*   BUN 13 12 13   CREATININE 1.0 0.8 0.7   GLUCOSE 88 81 78   CALCIUM 7.8* 7.2* 7.6*       Recent Labs     08/31/19 1942 09/01/19 0455 09/02/19  0420   WBC 6.7 7.5 7.7   RBC 3.57 3.25* 3.19*   HGB 10.7* 9.7* 9.7*   HCT 34.6 31.8* 31.1*   MCV 96.9 97.8 97.5   MCH 30.0 29.8 30.4   MCHC 30.9* 30.5* 31.2*   RDW 15.6* 15.6* 15.7*   * 106* 120*   MPV 10.5 10.2 10.4       Labs and images reviewed     Radiology:   CT ABDOMEN PELVIS WO CONTRAST Additional Contrast? None   Final Result   1. There is at least one large liver mass within segment 8 measuring about 9    cm, seen best on the prior outside exam. The differential diagnosis includes    primary hepatocellular carcinoma versus metastatic disease. The large increased    attenuation mass may be related to focal nodular hyperplasia. MRI imaging of    the abdomen using liver hemangioma is recommended. 2. Stable left ovarian cyst. Interval increase in the amount of radiodense    fluid in the pelvis, likely related to hemorrhage. 3. Hepatomegaly redemonstrated. There is fatty infiltration seen involving a    relatively normal liver. The prior exam demonstrates a least 2 nodules,    possibly related to regenerating nodules versus FNH involving segment 7 of the    liver. These nodules in segment 7 are only vaguely identified on narrow window    width imaging series 2 image 33.    4. Transverse and descending colon is. Differential etiology includes    inflammatory, infectious and ischemic causes. Clinical correlation is    recommended. 5. Left breast focal calcification.  Mammographic correlation is

## 2019-09-03 ENCOUNTER — APPOINTMENT (OUTPATIENT)
Dept: GENERAL RADIOLOGY | Age: 79
DRG: 189 | End: 2019-09-03
Payer: MEDICARE

## 2019-09-03 PROBLEM — R19.7 DIARRHEA: Status: ACTIVE | Noted: 2019-09-03

## 2019-09-03 PROBLEM — D69.6 THROMBOCYTOPENIA (HCC): Status: ACTIVE | Noted: 2019-09-03

## 2019-09-03 LAB
ALBUMIN SERPL-MCNC: 3.1 G/DL (ref 3.5–5.2)
ALP BLD-CCNC: 345 U/L (ref 35–104)
ALT SERPL-CCNC: 49 U/L (ref 0–32)
ANION GAP SERPL CALCULATED.3IONS-SCNC: 12 MMOL/L (ref 7–16)
AST SERPL-CCNC: 131 U/L (ref 0–31)
BASOPHILS ABSOLUTE: 0.01 E9/L (ref 0–0.2)
BASOPHILS RELATIVE PERCENT: 0.1 % (ref 0–2)
BILIRUB SERPL-MCNC: 1.5 MG/DL (ref 0–1.2)
BILIRUBIN DIRECT: 1.4 MG/DL (ref 0–0.3)
BILIRUBIN, INDIRECT: 0.1 MG/DL (ref 0–1)
BUN BLDV-MCNC: 13 MG/DL (ref 8–23)
CALCIUM SERPL-MCNC: 8.1 MG/DL (ref 8.6–10.2)
CHLORIDE BLD-SCNC: 104 MMOL/L (ref 98–107)
CO2: 21 MMOL/L (ref 22–29)
CREAT SERPL-MCNC: 0.8 MG/DL (ref 0.5–1)
EOSINOPHILS ABSOLUTE: 0.02 E9/L (ref 0.05–0.5)
EOSINOPHILS RELATIVE PERCENT: 0.3 % (ref 0–6)
GFR AFRICAN AMERICAN: >60
GFR NON-AFRICAN AMERICAN: >60 ML/MIN/1.73
GLUCOSE BLD-MCNC: 76 MG/DL (ref 74–99)
HAV IGM SER IA-ACNC: NORMAL
HCT VFR BLD CALC: 31.9 % (ref 34–48)
HEMOGLOBIN: 10.1 G/DL (ref 11.5–15.5)
HEPATITIS B CORE IGM ANTIBODY: NORMAL
HEPATITIS B SURFACE ANTIGEN INTERPRETATION: NORMAL
HEPATITIS C ANTIBODY INTERPRETATION: NORMAL
IMMATURE GRANULOCYTES #: 0.04 E9/L
IMMATURE GRANULOCYTES %: 0.5 % (ref 0–5)
LV EF: 65 %
LVEF MODALITY: NORMAL
LYMPHOCYTES ABSOLUTE: 1.04 E9/L (ref 1.5–4)
LYMPHOCYTES RELATIVE PERCENT: 13.6 % (ref 20–42)
MAGNESIUM: 2.2 MG/DL (ref 1.6–2.6)
MCH RBC QN AUTO: 30.4 PG (ref 26–35)
MCHC RBC AUTO-ENTMCNC: 31.7 % (ref 32–34.5)
MCV RBC AUTO: 96.1 FL (ref 80–99.9)
MONOCYTES ABSOLUTE: 0.81 E9/L (ref 0.1–0.95)
MONOCYTES RELATIVE PERCENT: 10.6 % (ref 2–12)
NEUTROPHILS ABSOLUTE: 5.75 E9/L (ref 1.8–7.3)
NEUTROPHILS RELATIVE PERCENT: 74.9 % (ref 43–80)
PATHOLOGIST REVIEW: NORMAL
PDW BLD-RTO: 15.9 FL (ref 11.5–15)
PLATELET # BLD: 127 E9/L (ref 130–450)
PMV BLD AUTO: 10.6 FL (ref 7–12)
POTASSIUM REFLEX MAGNESIUM: 4.1 MMOL/L (ref 3.5–5)
RBC # BLD: 3.32 E12/L (ref 3.5–5.5)
REASON FOR REJECTION: NORMAL
REJECTED TEST: NORMAL
SODIUM BLD-SCNC: 137 MMOL/L (ref 132–146)
TOTAL PROTEIN: 5.9 G/DL (ref 6.4–8.3)
URINE CULTURE, ROUTINE: NORMAL
WBC # BLD: 7.7 E9/L (ref 4.5–11.5)

## 2019-09-03 PROCEDURE — 85025 COMPLETE CBC W/AUTO DIFF WBC: CPT

## 2019-09-03 PROCEDURE — 93306 TTE W/DOPPLER COMPLETE: CPT

## 2019-09-03 PROCEDURE — 6370000000 HC RX 637 (ALT 250 FOR IP): Performed by: INTERNAL MEDICINE

## 2019-09-03 PROCEDURE — 36415 COLL VENOUS BLD VENIPUNCTURE: CPT

## 2019-09-03 PROCEDURE — 1200000000 HC SEMI PRIVATE

## 2019-09-03 PROCEDURE — 83735 ASSAY OF MAGNESIUM: CPT

## 2019-09-03 PROCEDURE — 99222 1ST HOSP IP/OBS MODERATE 55: CPT | Performed by: SURGERY

## 2019-09-03 PROCEDURE — 2580000003 HC RX 258: Performed by: INTERNAL MEDICINE

## 2019-09-03 PROCEDURE — 80053 COMPREHEN METABOLIC PANEL: CPT

## 2019-09-03 PROCEDURE — 6360000002 HC RX W HCPCS: Performed by: INTERNAL MEDICINE

## 2019-09-03 PROCEDURE — 71046 X-RAY EXAM CHEST 2 VIEWS: CPT

## 2019-09-03 PROCEDURE — 99222 1ST HOSP IP/OBS MODERATE 55: CPT | Performed by: TRANSPLANT SURGERY

## 2019-09-03 PROCEDURE — 97530 THERAPEUTIC ACTIVITIES: CPT

## 2019-09-03 PROCEDURE — 97165 OT EVAL LOW COMPLEX 30 MIN: CPT

## 2019-09-03 PROCEDURE — 80076 HEPATIC FUNCTION PANEL: CPT

## 2019-09-03 RX ADMIN — LEVOTHYROXINE SODIUM 88 MCG: 88 TABLET ORAL at 05:22

## 2019-09-03 RX ADMIN — PAROXETINE HYDROCHLORIDE 20 MG: 20 TABLET, FILM COATED ORAL at 07:55

## 2019-09-03 RX ADMIN — DEXAMETHASONE SODIUM PHOSPHATE 8 MG: 4 INJECTION, SOLUTION INTRAMUSCULAR; INTRAVENOUS at 07:55

## 2019-09-03 RX ADMIN — PANTOPRAZOLE SODIUM 40 MG: 40 TABLET, DELAYED RELEASE ORAL at 05:22

## 2019-09-03 RX ADMIN — Medication 10 ML: at 21:14

## 2019-09-03 RX ADMIN — Medication 10 ML: at 07:55

## 2019-09-03 ASSESSMENT — PAIN SCALES - GENERAL
PAINLEVEL_OUTOF10: 0
PAINLEVEL_OUTOF10: 0

## 2019-09-03 ASSESSMENT — ENCOUNTER SYMPTOMS
DIARRHEA: 1
CONSTIPATION: 0
SHORTNESS OF BREATH: 0
VOMITING: 0
NAUSEA: 0
EYE DISCHARGE: 0
BLOOD IN STOOL: 0
ABDOMINAL PAIN: 0
EYE PAIN: 0
BACK PAIN: 0
PHOTOPHOBIA: 0

## 2019-09-03 NOTE — CONSULTS
Pulmonary Consultation    Admit Date: 8/31/2019  Requesting Physician: Graciela Vaughn MD    CC:  Sob, cough  HPI:  · Pt is an [de-identified]year old female with a pmh of liver disease (melanoma) that presents to the hospital with increased nausea, sob, cough, white sputum production, and vomiting over the last month. Pt was diagnosed in August and has not yet received any treatment. · In ed pt was said to have a pulse ox of 79% on room air. CTA was done that showed opacity of the right middle lobe; possible pneumonia or atelectasis. Pt was started on Ceftriaxone and Azithromycin. Pt denies improvement in breathing since stay but she is currently 96% on ra. PMH:    Past Medical History:   Diagnosis Date    Breast cancer ([facility])     Diarrhea 9/3/2019    Hypothyroidism     Liver cancer ([facility])     Malignant melanoma ([facility]) 08/27/2019    LIVER BX; UNKNOWN ORIGIN    Rectal bleeding approx 6/30/14 started     light    SOB (shortness of breath)     Thrombocytopenia ([facility]) 9/3/2019     PSH:   Past Surgical History:   Procedure Laterality Date    BREAST SURGERY Left approximately 1999    lumpectomy    COLONOSCOPY  2006    COLONOSCOPY  7/8/2014    LIVER BIOPSY  08/2019    MALIGNANT MELANOMA,METASTATIC       Review of Systems:        · Constitutional: As noted in the HPI. Denies fever or chills. · Eyes: No visual changes or diplopia. No scleral icterus. · ENT: No headaches, hearing loss or vertigo. No nasal congestion, or sore throat. · Cardiovascular: No chest pain, dyspnea on exertion, or palpitations. · Respiratory:  cough, SOB, sputum production  · Gastrointestinal:   Slight abdominal pain, nausea   · Genitourinary: No dysuria, urinary frequency, or incontinence. No hematuria. · Musculoskeletal: No gait disturbance, weakness or joint complaints. · Integumentary: No rash or pruritis. No abnormal pigmentation, hair or nail changes.   · Neurological: No headache, diplopia, dizziness, tremor, change organmegaly. Normal bowel sounds. Skin: Warm and dry. No nodule on exposed extremities. No rash on exposed extremities. Lymph: No cervical LAD. No supraclavicular LAD. Ext: No joint deformity. No clubbing. No cyanosis. No edema  Neuro: Awake. Follows commands. Positive pupils/gag/corneals. Normal pain response. Lab Results:  CBC:   Recent Labs     09/01/19 0455 09/02/19 0420 09/03/19  0346   WBC 7.5 7.7 7.7   HGB 9.7* 9.7* 10.1*   HCT 31.8* 31.1* 31.9*   MCV 97.8 97.5 96.1   * 120* 127*       BMP:  Recent Labs     08/31/19  1942 09/01/19 0455 09/02/19 0420 09/03/19  0515    139 137 137   K 4.4 4.3 3.9 4.1    107 105 104   CO2 22 19* 19* 21*   PHOS 3.4  --   --   --    BUN 13 12 13 13   CREATININE 1.0 0.8 0.7 0.8    ALB:3,BILIDIR:3,BILITOT:3,ALKPHOS:3)@    PT/INR: No results for input(s): PROTIME, INR in the last 72 hours. Cultures:  Sputum: not available  Blood: not available    ABG:   No results for input(s): PH, PO2, PCO2, HCO3, BE, O2SAT, METHB, O2HB, COHB, O2CON, HHB, THB in the last 72 hours. Films:     CT ABDOMEN PELVIS WO CONTRAST Additional Contrast? None   Final Result   1. There is at least one large liver mass within segment 8 measuring about 9    cm, seen best on the prior outside exam. The differential diagnosis includes    primary hepatocellular carcinoma versus metastatic disease. The large increased    attenuation mass may be related to focal nodular hyperplasia. MRI imaging of    the abdomen using liver hemangioma is recommended. 2. Stable left ovarian cyst. Interval increase in the amount of radiodense    fluid in the pelvis, likely related to hemorrhage. 3. Hepatomegaly redemonstrated. There is fatty infiltration seen involving a    relatively normal liver. The prior exam demonstrates a least 2 nodules,    possibly related to regenerating nodules versus FNH involving segment 7 of the    liver.  These nodules in segment 7 are only vaguely identified questions. Office [phone] or after hours through [place], x [phone]. Please note that voice recognition technology was used in the preparation of this note and make therefore it may contain inadvertent transcription errors    aSskia Morfin M.D., FAldairCAldairCPAULINO.     [facility] [address] [facility], [address], [address] [place] [address]

## 2019-09-03 NOTE — PROGRESS NOTES
while negative for  pankeratin. This immunophenotype and histologic features are  characteristic of melanoma. She originally was complaining of right upper abdominal discomfort along with elevation of transaminases and therefore underwent a liver biopsy. Since admission she was placed on azithromycin and ceftriaxone IV. CTA of the chest on admission shows the following--    FINDINGS:  Central and segmental pulmonary arteries demonstrate normal  opacification without filling defects to suggest pulmonary arterial  emboli. Subsegmental pulmonary arteries are not optimally opacified  and there is motion. The heart is normal in size. No evidence of pericardial effusion. Focal opacities are seen within inferior aspect of right middle lobe. No evidence of pleural effusion. No pneumothorax. View of the upper  abdomen shows grossly normal bilateral adrenal glands. There is  hepatomegaly.      Impression:         1. Limited assessment of subsegmental pulmonary arteries due to  suboptimal opacification however no evidence of large central or  segmental pulmonary embolism. 2. Opacities are seen within inferior aspect of right middle lobe  which most likely represent subsegmental atelectasis. Clinical  correlation recommended for possibility of pneumonia. Blood cultures have been negative; viral respiratory panel negative. CT of the abdomen done on admission with following results--    FINDINGS:   Lungs: Atelectasis/partial consolidation medial segment middle lobe. Bronchiectasis medial segment middle lobe. Liver: Hepatomegaly is present with the right liver lobe length measuring 24   cm. Left lobe liver cyst measures 1.1 cm series 2 image 20. Low-attenuation   involves much of the liver except for the medial segment of left liver lobe   which demonstrates somewhat normal attenuation however appears slightly   enlarged.  Ill-defined mass right liver lobe correlates with area of vague   diminished DNR (do not resuscitate) discussion [Z71.89]    Pneumonia [J18.9]                 ------------  INFORMATION  -----------      DIET:DIET LOW FAT;      No Known Allergies      MEDICATION SIDE EFFECTS:none       SCHEDULED MEDS:                                 Current Facility-Administered Medications   Medication Dose Route Frequency Provider Last Rate Last Dose    cefTRIAXone (ROCEPHIN) 1 g in dextrose 5 % 50 mL IVPB (vial-mate)  1 g Intravenous Q24H Shawn Lanier MD   Stopped at 09/02/19 2056    azithromycin (ZITHROMAX) tablet 250 mg  250 mg Oral Daily Shawn Lanier MD   250 mg at 09/02/19 2026    oxyCODONE-acetaminophen (PERCOCET) 5-325 MG per tablet 1 tablet  1 tablet Oral Q4H PRN Neelam Turcios MD        dexamethasone (DECADRON) injection 8 mg  8 mg Intravenous Daily Neelam Turcios MD   8 mg at 09/03/19 0755    pantoprazole (PROTONIX) tablet 40 mg  40 mg Oral QAM AC Neelam Turcios MD   40 mg at 09/03/19 0522    sodium chloride flush 0.9 % injection 10 mL  10 mL Intravenous 2 times per day Shawn Lanier MD   10 mL at 09/03/19 0755    sodium chloride flush 0.9 % injection 10 mL  10 mL Intravenous PRN Shawn Lanier MD        enoxaparin (LOVENOX) injection 40 mg  40 mg Subcutaneous Daily Shawn Lanier MD        levothyroxine (SYNTHROID) tablet 88 mcg  88 mcg Oral Daily Lizandro Bone MD   88 mcg at 09/03/19 0522    PARoxetine (PAXIL) tablet 20 mg  20 mg Oral QAM Lizandro Bone MD   20 mg at 09/03/19 0755    sodium chloride flush 0.9 % injection 10 mL  10 mL Intravenous 2 times per day Shawn Lanier MD   10 mL at 09/03/19 0755    sodium chloride flush 0.9 % injection 10 mL  10 mL Intravenous PRN Shawn Lanier MD        potassium chloride (KLOR-CON M) extended release tablet 40 mEq  40 mEq Oral PRN Shawn Lanier MD        Or    potassium bicarb-citric acid (EFFER-K) effervescent tablet 40 mEq  40 mEq Oral PRN Shawn Lanier MD        Or    potassium chloride 10 mEq/100 mL IVPB (Peripheral Line)  10 mEq RDW 15.6* 15.7* 15.9*   LYMPHOPCT 10.1* 15.0* 13.6*   MONOPCT 10.9 11.5 10.6   BASOPCT 0.1 0.0 0.1   MONOSABS 0.82 0.88 0.81   LYMPHSABS 0.76* 1.15* 1.04*   EOSABS 0.00* 0.02* 0.02*   BASOSABS 0.01 0.00 0.01          H & H :  Recent Labs     08/31/19 1942 09/01/19 0455 09/02/19 0420 09/03/19  0346   HGB 10.7* 9.7* 9.7* 10.1*       TSH:  Recent Labs     09/01/19 0455   TSH 3.240       GLUCOSE:No results for input(s): POCGLU in the last 72 hours. CMP:  Recent Labs     09/01/19 0455 09/02/19 0420 09/03/19  0515    137 137   K 4.3 3.9 4.1    105 104   CO2 19* 19* 21*   BUN 12 13 13   CREATININE 0.8 0.7 0.8   GFRAA >60 >60 >60   LABGLOM >60 >60 >60   GLUCOSE 81 78 76   PROT 5.6* 5.6* 5.9*   LABALBU 2.8* 2.9* 3.1*   CALCIUM 7.2* 7.6* 8.1*   BILITOT 1.7* 1.2 1.5*   ALKPHOS 332* 324* 345*   * 145* 131*   ALT 51* 50* 49*        BNP:No results found for: BNP    PROTIME/INR:No results for input(s): PROTIME, INR in the last 72 hours. CRP:   Recent Labs     08/31/19 1942   CRP 7.9*       ESR:No results for input(s): Delynn Shelling in the last 72 hours. LIPASE , AMYLASE:  No results found for: LIPASE   No results found for: AMYLASE    ABGs: No results found for: PH, PO2, PCO2    CARDIAC:   Recent Labs     08/31/19 1942 09/01/19  0038   CKTOTAL 52  --    TROPONINI <0.01 <0.01       Lipid Profile:   Lab Results   Component Value Date    TRIG 103 01/18/2018    HDL 50 01/18/2018    LDLCALC 116 01/18/2018    CHOL 187 01/18/2018        No results found for: LABA1C         RADIOLOGY: REVIEWED AVAILABLE REPORT  CT ABDOMEN PELVIS WO CONTRAST Additional Contrast? None   Final Result   1. There is at least one large liver mass within segment 8 measuring about 9    cm, seen best on the prior outside exam. The differential diagnosis includes    primary hepatocellular carcinoma versus metastatic disease. The large increased    attenuation mass may be related to focal nodular hyperplasia.  MRI imaging of    the

## 2019-09-03 NOTE — CONSULTS
COMPARISON: Outside CT scan abdomen and pelvis 08/16/2019. FINDINGS: Lungs: Atelectasis/partial consolidation medial segment middle lobe. Bronchiectasis medial segment middle lobe. Liver: Hepatomegaly is present with the right liver lobe length measuring 24 cm. Left lobe liver cyst measures 1.1 cm series 2 image 20. Low-attenuation involves much of the liver except for the medial segment of left liver lobe which demonstrates somewhat normal attenuation however appears slightly enlarged. Ill-defined mass right liver lobe correlates with area of vague diminished attenuation on arterial phase imaging from prior CT scan abdomen dated 08/16/2019. Gallbladder and bile ducts: The gallbladder is contracted. The gallbladder wall thickness is 7 mm. Pancreas: Normal. No ductal dilation. Spleen: Normal. No splenomegaly. Adrenals: Normal. No mass. Kidneys and ureters: Normal. No hydronephrosis. Stomach and bowel: Fat halo sign is seen involving the colon and is considered a nonspecific finding. The terminal ileum is within normal limits. There are multiple colonic diverticuli. Segmental circumferential wall thickening transverse colon and descending colon. Appendix: The appendix is visualized and within normal limits for size and wall thickness. Intraperitoneal space: There appears to be some free fluid in the pelvis which is somewhat radiodense (Hounsfield unit density = 32), positioned just above the urinary bladder and posterior to the uterus. The overall amount of fluid in the pelvis has increased slightly since the prior examination. There is new. Ascites. Vasculature: Atherosclerotic vascular calcifications are noted involving the aorta. Lymph nodes: Anterior cardiophrenic lymph node measures 1.1 x 1.7 cm, unchanged. Bladder: Unremarkable as visualized. Reproductive: One or more right sided ovarian cysts are noted and the largest one measures 2.9 x 4.3 cm. Bones/joints:  There are multilevel degenerative changes seen in the MD. Akila Shah Chest Portable    Result Date: 2019  Patient MRN:  [id number] : 1940 Age: [de-identified] years Gender: Female Order Date:  2019 7:45 PM EXAM: XR CHEST PORTABLE COMPARISON: None INDICATION:  pneumonia pneumonia FINDINGS: The heart is normal in size. No focal airspace opacity. There is no pleural effusion. There is no pneumothorax. No free air beneath the diaphragms. No airspace opacities or pleural effusion. Cta Pulmonary W Contrast    Result Date: 2019  Patient MRN:  [id number] : 1940 Age: [de-identified] years Gender: Female Order Date:  2019 7:45 PM EXAM: CTA PULMONARY W CONTRAST COMPARISON: None INDICATION:  CHEST PAIN, ACUTE, PULMONARY EMBOLISM SUSPECTED  TECHNIQUE: Axial thin section CT imaging was obtained from the apices of the lungs through the lung bases following a rapid bolus administration of IV contrast. Coronal and sagittal MIP images were obtained for aid in interpretation. Low-dose CT acquisition technique included one of the following options; 1. Automated exposure control, 2. Adjustment of mA and/or kV according to the patient's size or 3. Use of iterative reconstruction. FINDINGS: Central and segmental pulmonary arteries demonstrate normal opacification without filling defects to suggest pulmonary arterial emboli. Subsegmental pulmonary arteries are not optimally opacified and there is motion. The heart is normal in size. No evidence of pericardial effusion. Focal opacities are seen within inferior aspect of right middle lobe. No evidence of pleural effusion. No pneumothorax. View of the upper abdomen shows grossly normal bilateral adrenal glands. There is hepatomegaly. 1. Limited assessment of subsegmental pulmonary arteries due to suboptimal opacification however no evidence of large central or segmental pulmonary embolism. 2. Opacities are seen within inferior aspect of right middle lobe which most likely represent subsegmental atelectasis.  Clinical correlation

## 2019-09-03 NOTE — CONSULTS
Highest education level: Not on file   Occupational History    Not on file   Social Needs    Financial resource strain: Not on file    Food insecurity:     Worry: Not on file     Inability: Not on file    Transportation needs:     Medical: Not on file     Non-medical: Not on file   Tobacco Use    Smoking status: Never Smoker    Smokeless tobacco: Never Used   Substance and Sexual Activity    Alcohol use: No    Drug use: No    Sexual activity: Not on file   Lifestyle    Physical activity:     Days per week: Not on file     Minutes per session: Not on file    Stress: Not on file   Relationships    Social connections:     Talks on phone: Not on file     Gets together: Not on file     Attends Synagogue service: Not on file     Active member of club or organization: Not on file     Attends meetings of clubs or organizations: Not on file     Relationship status: Not on file    Intimate partner violence:     Fear of current or ex partner: Not on file     Emotionally abused: Not on file     Physically abused: Not on file     Forced sexual activity: Not on file   Other Topics Concern    Not on file   Social History Narrative    Not on file       ROS:   Review of Systems   Constitutional: Negative for chills, diaphoresis and fever. HENT: Negative for congestion, ear discharge, ear pain, hearing loss, nosebleeds and tinnitus. Eyes: Negative for photophobia, pain and discharge. Respiratory: Negative for shortness of breath. Cardiovascular: Negative for palpitations and leg swelling. Gastrointestinal: Positive for diarrhea. Negative for abdominal pain, blood in stool, constipation, nausea and vomiting. Endocrine: Negative for polydipsia. Genitourinary: Negative for frequency, hematuria and urgency. Musculoskeletal: Negative for back pain and neck pain. Skin: Negative for rash. Allergic/Immunologic: Negative for environmental allergies. Neurological: Negative for tremors and seizures.

## 2019-09-03 NOTE — CONSULTS
Pulmonary Consultation    Admit Date: 8/31/2019  Requesting Physician: Bruna Clark MD    CC:  Sob, cough  HPI:  · Pt is an [de-identified]year old female with a pmh of liver disease (melanoma) that presents to the hospital with increased nausea, sob, cough, white sputum production, and vomiting over the last month. Pt was diagnosed in August and has not yet received any treatment. · In ed pt was said to have a pulse ox of 79% on room air. CTA was done that showed opacity of the right middle lobe; possible pneumonia or atelectasis. Pt was started on Ceftriaxone and Azithromycin. Pt denies improvement in breathing since stay but she is currently 96% on ra. · There is no history of fever chills night sweats or weight loss. There is no history of hemoptysis or mucus production. Since admission, the patient states she is breathing pretty well. She appears comfortable and conversant. PMH:    Past Medical History:   Diagnosis Date    Breast cancer ([facility] [address]     Diarrhea 9/3/2019    Hypothyroidism     Liver cancer ([address]     Malignant melanoma ([facility] [address] 08/27/2019    LIVER BX; UNKNOWN ORIGIN    Rectal bleeding approx 6/30/14 started     light    SOB (shortness of breath)     Thrombocytopenia ([address] 9/3/2019     PSH:   Past Surgical History:   Procedure Laterality Date    BREAST SURGERY Left approximately 1999    lumpectomy    COLONOSCOPY  2006    COLONOSCOPY  7/8/2014    LIVER BIOPSY  08/2019    MALIGNANT MELANOMA,METASTATIC       Review of Systems:        · Constitutional: As noted in the HPI. Denies fever or chills. · Eyes: No visual changes or diplopia. No scleral icterus. · ENT: No headaches, hearing loss or vertigo. No nasal congestion, or sore throat. · Cardiovascular: No chest pain, dyspnea on exertion, or palpitations. · Respiratory:  cough, SOB, sputum production  · Gastrointestinal:   Slight abdominal pain, nausea   · Genitourinary: No dysuria, urinary frequency, or incontinence.  No

## 2019-09-04 VITALS
OXYGEN SATURATION: 99 % | SYSTOLIC BLOOD PRESSURE: 122 MMHG | WEIGHT: 127 LBS | BODY MASS INDEX: 20.41 KG/M2 | TEMPERATURE: 98.1 F | HEIGHT: 66 IN | DIASTOLIC BLOOD PRESSURE: 60 MMHG | HEART RATE: 93 BPM | RESPIRATION RATE: 16 BRPM

## 2019-09-04 PROCEDURE — 2580000003 HC RX 258: Performed by: INTERNAL MEDICINE

## 2019-09-04 PROCEDURE — 6360000002 HC RX W HCPCS: Performed by: INTERNAL MEDICINE

## 2019-09-04 PROCEDURE — 97530 THERAPEUTIC ACTIVITIES: CPT

## 2019-09-04 PROCEDURE — 6370000000 HC RX 637 (ALT 250 FOR IP): Performed by: INTERNAL MEDICINE

## 2019-09-04 RX ORDER — DEXAMETHASONE 4 MG/1
4 TABLET ORAL 2 TIMES DAILY WITH MEALS
Qty: 20 TABLET | Refills: 0 | Status: ON HOLD | OUTPATIENT
Start: 2019-09-04 | End: 2019-09-09

## 2019-09-04 RX ORDER — PANTOPRAZOLE SODIUM 40 MG/1
40 TABLET, DELAYED RELEASE ORAL
Qty: 30 TABLET | Refills: 3 | Status: ON HOLD | OUTPATIENT
Start: 2019-09-05 | End: 2019-09-09

## 2019-09-04 RX ADMIN — PAROXETINE HYDROCHLORIDE 20 MG: 20 TABLET, FILM COATED ORAL at 07:27

## 2019-09-04 RX ADMIN — LEVOTHYROXINE SODIUM 88 MCG: 88 TABLET ORAL at 07:27

## 2019-09-04 RX ADMIN — PANTOPRAZOLE SODIUM 40 MG: 40 TABLET, DELAYED RELEASE ORAL at 07:27

## 2019-09-04 RX ADMIN — Medication 10 ML: at 07:28

## 2019-09-04 RX ADMIN — DEXAMETHASONE SODIUM PHOSPHATE 8 MG: 4 INJECTION, SOLUTION INTRAMUSCULAR; INTRAVENOUS at 07:27

## 2019-09-04 ASSESSMENT — PAIN SCALES - GENERAL: PAINLEVEL_OUTOF10: 0

## 2019-09-04 NOTE — PROGRESS NOTES
Pulmonary Progress Note    Admit Date: 2019  Hospital day                               PCP: Mery Ledesma MD    Chief Complaint (s):  Patient Active Problem List   Diagnosis    Pneumonia    Acute hepatitis    History of known metastasis to liver    Acquired hypothyroidism    Hypoxia    Acute respiratory failure with hypoxia ([facility] [address]    Palliative care encounter    DNR (do not resuscitate) discussion    Malignant melanoma ([facility] [address]    Diarrhea    Thrombocytopenia ([facility] [address]       Subjective:  · Awake and alert, no dyspnea. No cough. Vitals:  VITALS:  /82   Pulse 89   Temp 98 °F (36.7 °C) (Oral)   Resp 15   Ht 5' 5.5\" (1.664 m)   Wt 127 lb (57.6 kg)   SpO2 97%   BMI 20.81 kg/m²     24HR INTAKE/OUTPUT:      Intake/Output Summary (Last 24 hours) at 2019 1035  Last data filed at 2019 0858  Gross per 24 hour   Intake 480 ml   Output --   Net 480 ml       24HR PULSE OXIMETRY RANGE:    SpO2  Av.5 %  Min: 96 %  Max: 97 %    Medications:  IV:      Scheduled Meds:   lipase-protease-amylase  2 capsule Oral TID WC    dexamethasone  8 mg Intravenous Daily    pantoprazole  40 mg Oral QAM AC    enoxaparin  40 mg Subcutaneous Daily    levothyroxine  88 mcg Oral Daily    PARoxetine  20 mg Oral QAM    sodium chloride flush  10 mL Intravenous 2 times per day       Diet:   DIET LOW FAT;     EXAM:  General: No distress. Alert. Eyes: PERRL. No sclera icterus. No conjunctival injection. ENT: No discharge. Pharynx clear. Neck: Trachea midline. Normal thyroid. Resp: No accessory muscle use. No rales. No wheezing. No rhonchi. CV: Regular rate. Regular rhythm. No murmur or rub. Abd: Non-tender. Non-distended. No masses. No organomegaly. Normal bowel sounds. Skin: Warm and dry. No nodule on exposed extremities. No rash on exposed extremities. Ext: No cyanosis, clubbing, edema  Lymph: No cervical LAD. No supraclavicular LAD. M/S: No cyanosis. No joint deformity.  No

## 2019-09-04 NOTE — PROGRESS NOTES
Physical Therapy    Facility/Department: [address] MED SURG  Treatment note    NAME: Cesia Bella  : 1940  MRN: [id number]    Date of Service: 2019    Room #: 520  DIAGNOSIS: Pneumonia  PRECAUTIONS: falls  AMPA Score: 20/24    Pt is agreeable to Rx. Pain level is: no c/o pain  Balance: Independent sitting and supervision standing     Functional Mobility  Bed Mobility  Rolling: NA  Supine to sit: NA  Sit to supine: NA  Scooting: NA     Transfers  Sit to stand: Supervision  Stand to sit: Supervision  Stand pivot: Supervision     Locomotion  Ambulation: 200 feet with no AD SBA/supervision  Stair negotiation: 4 steps with 1 rail SBA/supervision    Additional Comments: Pt was found sitting EOB Independent talking with . She c/o feeling tired, but agreeable to amb. She walked with slow mild unsteady gait, but had no LOB. Pt was left sitting EOB per her request with call light left by patient. Time in: 09:00  Time out: 09:15    Pt is making good progress toward established Physical Therapy goals. Continue with physical therapy current plan of care. Josephine Gonzales, P.T.   License Number: [id number]

## 2019-09-04 NOTE — PROGRESS NOTES
mass may be related to focal nodular hyperplasia. MRI imaging of   the abdomen using liver hemangioma is recommended. 2. Stable left ovarian cyst. Interval increase in the amount of radiodense   fluid in the pelvis, likely related to hemorrhage. 3. Hepatomegaly redemonstrated. There is fatty infiltration seen involving a   relatively normal liver. The prior exam demonstrates a least 2 nodules,   possibly related to regenerating nodules versus FNH involving segment 7 of the   liver. These nodules in segment 7 are only vaguely identified on narrow window   width imaging series 2 image 33.   4. Transverse and descending colon is. Differential etiology includes   inflammatory, infectious and ischemic causes. Clinical correlation is   recommended. 5. Left breast focal calcification. Mammographic correlation is recommended. 6. Stable middle lobe atelectasis/partial consolidation with bronchiectasis. 7. Additional nonacute findings as described above. She has been seen by hepatic oncology surgeon; as well as oncology. Those notes appreciated. Oncology suggested colonoscopy in view of findings on CT; I discussed the above with surgery  Since admission troponins have been negative x3; initial proBNP 603. Stool for C. difficile is negative. 9/4/2019-patient laying quietly in bed; no chest pain no dyspnea. Appetite fair. Patient hoping for discharge today. Temperature 98.1 respirations 16 pulse 93 blood pressure 122/60.  99% saturation on room air. Labs pending. Patient was seen yesterday by pulmonary; they did not believe patient has pneumonia. Rather, atelectasis due to compression from enlarged liver. Patient seen yesterday by surgical service; patient has declined colonoscopy. Patient seen today by oncology, they state patient does not want any further work-up will follow-up as outpatient. Chest x-ray repeated from yesterday, no evidence of pneumonia.         ROS:  Negative to a 10 system patient states she would like to see her for possible repeat colonoscopy  Recheck procalcitonin  Monitor labs  Pulmonary consult regarding possible pneumonia  PA lateral chest x-ray, pneumonia  Med reconciliation completed  Prescriptions written  IV antibiotics have been stopped  Follow-up 1 week Dr. Roby Robles with oncology           Discussed with patient and spouse and nursing.       Hawa Bautista DO     12:36 PM     9/4/2019    TIME > 35 MINUTES    >  50 %  OF  TIME  DISCUSSION     Active Hospital Problems    Diagnosis    Diarrhea [R19.7]    Thrombocytopenia ([facility] [address] [D69.6]    Malignant melanoma ([facility] [address] [C43.9]    Acute hepatitis [B17.9]    History of known metastasis to liver [Z85.05]    Acquired hypothyroidism [E03.9]    Hypoxia [R09.02]    Acute respiratory failure with hypoxia ([facility] [address] [J96.01]    Palliative care encounter [Z51.5]    DNR (do not resuscitate) discussion [Z71.89]    Pneumonia [J18.9]                 ------------  INFORMATION  -----------      DIET:DIET LOW FAT;      No Known Allergies      MEDICATION SIDE EFFECTS:none       SCHEDULED MEDS:                                 Current Facility-Administered Medications   Medication Dose Route Frequency Provider Last Rate Last Dose    lipase-protease-amylase (CREON) 50889 units delayed release capsule 2 capsule  2 capsule Oral TID  Rogerio Barron MD        oxyCODONE-acetaminophen (PERCOCET) 5-325 MG per tablet 1 tablet  1 tablet Oral Q4H PRN Mariel Meyer MD        dexamethasone (DECADRON) injection 8 mg  8 mg Intravenous Daily Mariel Meyer MD   8 mg at 09/04/19 0727    pantoprazole (PROTONIX) tablet 40 mg  40 mg Oral QAM AC Mariel Meyer MD   40 mg at 09/04/19 3510    sodium chloride flush 0.9 % injection 10 mL  10 mL Intravenous PRN Lizandro Bone MD        enoxaparin (LOVENOX) injection 40 mg  40 mg Subcutaneous Daily Lizandro Bone MD        levothyroxine (SYNTHROID) tablet 88 mcg  88 mcg Oral Daily Karen Pennington MD

## 2019-09-05 LAB
BLOOD CULTURE, ROUTINE: NORMAL
CULTURE, BLOOD 2: NORMAL

## 2019-09-09 ENCOUNTER — HOSPITAL ENCOUNTER (INPATIENT)
Age: 79
LOS: 2 days | Discharge: HOSPICE | DRG: 596 | End: 2019-09-11
Attending: EMERGENCY MEDICINE | Admitting: INTERNAL MEDICINE
Payer: MEDICARE

## 2019-09-09 ENCOUNTER — APPOINTMENT (OUTPATIENT)
Dept: GENERAL RADIOLOGY | Age: 79
DRG: 596 | End: 2019-09-09
Payer: MEDICARE

## 2019-09-09 ENCOUNTER — TELEPHONE (OUTPATIENT)
Dept: OTHER | Facility: CLINIC | Age: 79
End: 2019-09-09

## 2019-09-09 DIAGNOSIS — R53.83 FATIGUE, UNSPECIFIED TYPE: Primary | ICD-10-CM

## 2019-09-09 DIAGNOSIS — N30.00 ACUTE CYSTITIS WITHOUT HEMATURIA: ICD-10-CM

## 2019-09-09 PROBLEM — C43.9 METASTATIC MELANOMA (HCC): Status: ACTIVE | Noted: 2019-09-09

## 2019-09-09 LAB
ALBUMIN SERPL-MCNC: 3 G/DL (ref 3.5–5.2)
ALP BLD-CCNC: 475 U/L (ref 35–104)
ALT SERPL-CCNC: 88 U/L (ref 0–32)
ANION GAP SERPL CALCULATED.3IONS-SCNC: 17 MMOL/L (ref 7–16)
AST SERPL-CCNC: 281 U/L (ref 0–31)
BACTERIA: ABNORMAL /HPF
BASOPHILS ABSOLUTE: 0.01 E9/L (ref 0–0.2)
BASOPHILS RELATIVE PERCENT: 0.1 % (ref 0–2)
BILIRUB SERPL-MCNC: 5.8 MG/DL (ref 0–1.2)
BILIRUBIN URINE: ABNORMAL
BLOOD, URINE: NEGATIVE
BUN BLDV-MCNC: 26 MG/DL (ref 8–23)
CALCIUM SERPL-MCNC: 8 MG/DL (ref 8.6–10.2)
CHLORIDE BLD-SCNC: 97 MMOL/L (ref 98–107)
CLARITY: CLEAR
CO2: 21 MMOL/L (ref 22–29)
COLOR: ABNORMAL
CREAT SERPL-MCNC: 1 MG/DL (ref 0.5–1)
EOSINOPHILS ABSOLUTE: 0.02 E9/L (ref 0.05–0.5)
EOSINOPHILS RELATIVE PERCENT: 0.2 % (ref 0–6)
GFR AFRICAN AMERICAN: >60
GFR NON-AFRICAN AMERICAN: 53 ML/MIN/1.73
GLUCOSE BLD-MCNC: 81 MG/DL (ref 74–99)
GLUCOSE URINE: NEGATIVE MG/DL
HCT VFR BLD CALC: 30.4 % (ref 34–48)
HEMOGLOBIN: 9.8 G/DL (ref 11.5–15.5)
IMMATURE GRANULOCYTES #: 0.14 E9/L
IMMATURE GRANULOCYTES %: 1.6 % (ref 0–5)
INR BLD: 1.4
KETONES, URINE: 15 MG/DL
LEUKOCYTE ESTERASE, URINE: NEGATIVE
LIPASE: 21 U/L (ref 13–60)
LYMPHOCYTES ABSOLUTE: 1.1 E9/L (ref 1.5–4)
LYMPHOCYTES RELATIVE PERCENT: 12.2 % (ref 20–42)
MCH RBC QN AUTO: 31 PG (ref 26–35)
MCHC RBC AUTO-ENTMCNC: 32.2 % (ref 32–34.5)
MCV RBC AUTO: 96.2 FL (ref 80–99.9)
MONOCYTES ABSOLUTE: 0.81 E9/L (ref 0.1–0.95)
MONOCYTES RELATIVE PERCENT: 9 % (ref 2–12)
NEUTROPHILS ABSOLUTE: 6.93 E9/L (ref 1.8–7.3)
NEUTROPHILS RELATIVE PERCENT: 76.9 % (ref 43–80)
NITRITE, URINE: POSITIVE
PDW BLD-RTO: 17.3 FL (ref 11.5–15)
PH UA: 5 (ref 5–9)
PLATELET # BLD: 105 E9/L (ref 130–450)
PMV BLD AUTO: 11.2 FL (ref 7–12)
POTASSIUM REFLEX MAGNESIUM: 5.2 MMOL/L (ref 3.5–5)
PRO-BNP: 1632 PG/ML (ref 0–450)
PROTEIN UA: 30 MG/DL
PROTHROMBIN TIME: 15.9 SEC (ref 9.3–12.4)
RBC # BLD: 3.16 E12/L (ref 3.5–5.5)
RBC UA: ABNORMAL /HPF (ref 0–2)
SODIUM BLD-SCNC: 135 MMOL/L (ref 132–146)
SPECIFIC GRAVITY UA: 1.02 (ref 1–1.03)
T4 TOTAL: 6.9 MCG/DL (ref 4.5–11.7)
TOTAL PROTEIN: 6.2 G/DL (ref 6.4–8.3)
TROPONIN: 0.02 NG/ML (ref 0–0.03)
TSH SERPL DL<=0.05 MIU/L-ACNC: 1.06 UIU/ML (ref 0.27–4.2)
UROBILINOGEN, URINE: 0.2 E.U./DL
WBC # BLD: 9 E9/L (ref 4.5–11.5)
WBC UA: ABNORMAL /HPF (ref 0–5)

## 2019-09-09 PROCEDURE — 87088 URINE BACTERIA CULTURE: CPT

## 2019-09-09 PROCEDURE — 6370000000 HC RX 637 (ALT 250 FOR IP): Performed by: INTERNAL MEDICINE

## 2019-09-09 PROCEDURE — 2580000003 HC RX 258: Performed by: EMERGENCY MEDICINE

## 2019-09-09 PROCEDURE — 93005 ELECTROCARDIOGRAM TRACING: CPT | Performed by: EMERGENCY MEDICINE

## 2019-09-09 PROCEDURE — 84443 ASSAY THYROID STIM HORMONE: CPT

## 2019-09-09 PROCEDURE — 2580000003 HC RX 258: Performed by: INTERNAL MEDICINE

## 2019-09-09 PROCEDURE — 99285 EMERGENCY DEPT VISIT HI MDM: CPT

## 2019-09-09 PROCEDURE — 85025 COMPLETE CBC W/AUTO DIFF WBC: CPT

## 2019-09-09 PROCEDURE — 83690 ASSAY OF LIPASE: CPT

## 2019-09-09 PROCEDURE — 84484 ASSAY OF TROPONIN QUANT: CPT

## 2019-09-09 PROCEDURE — 84436 ASSAY OF TOTAL THYROXINE: CPT

## 2019-09-09 PROCEDURE — 87040 BLOOD CULTURE FOR BACTERIA: CPT

## 2019-09-09 PROCEDURE — 71046 X-RAY EXAM CHEST 2 VIEWS: CPT

## 2019-09-09 PROCEDURE — 2060000000 HC ICU INTERMEDIATE R&B

## 2019-09-09 PROCEDURE — 81001 URINALYSIS AUTO W/SCOPE: CPT

## 2019-09-09 PROCEDURE — 85610 PROTHROMBIN TIME: CPT

## 2019-09-09 PROCEDURE — 80053 COMPREHEN METABOLIC PANEL: CPT

## 2019-09-09 PROCEDURE — 36415 COLL VENOUS BLD VENIPUNCTURE: CPT

## 2019-09-09 PROCEDURE — 6360000002 HC RX W HCPCS: Performed by: INTERNAL MEDICINE

## 2019-09-09 PROCEDURE — 83880 ASSAY OF NATRIURETIC PEPTIDE: CPT

## 2019-09-09 RX ORDER — 0.9 % SODIUM CHLORIDE 0.9 %
1000 INTRAVENOUS SOLUTION INTRAVENOUS ONCE
Status: COMPLETED | OUTPATIENT
Start: 2019-09-09 | End: 2019-09-09

## 2019-09-09 RX ORDER — DEXAMETHASONE 4 MG/1
4 TABLET ORAL 2 TIMES DAILY WITH MEALS
Status: DISCONTINUED | OUTPATIENT
Start: 2019-09-09 | End: 2019-09-11 | Stop reason: HOSPADM

## 2019-09-09 RX ORDER — SODIUM CHLORIDE, SODIUM LACTATE, POTASSIUM CHLORIDE, CALCIUM CHLORIDE 600; 310; 30; 20 MG/100ML; MG/100ML; MG/100ML; MG/100ML
INJECTION, SOLUTION INTRAVENOUS CONTINUOUS
Status: DISCONTINUED | OUTPATIENT
Start: 2019-09-09 | End: 2019-09-11 | Stop reason: HOSPADM

## 2019-09-09 RX ORDER — PAROXETINE HYDROCHLORIDE 20 MG/1
20 TABLET, FILM COATED ORAL EVERY MORNING
Status: DISCONTINUED | OUTPATIENT
Start: 2019-09-10 | End: 2019-09-11 | Stop reason: HOSPADM

## 2019-09-09 RX ORDER — SODIUM CHLORIDE 0.9 % (FLUSH) 0.9 %
10 SYRINGE (ML) INJECTION EVERY 12 HOURS SCHEDULED
Status: DISCONTINUED | OUTPATIENT
Start: 2019-09-09 | End: 2019-09-11 | Stop reason: HOSPADM

## 2019-09-09 RX ORDER — LEVOTHYROXINE SODIUM 88 UG/1
88 TABLET ORAL DAILY
Status: DISCONTINUED | OUTPATIENT
Start: 2019-09-10 | End: 2019-09-11 | Stop reason: HOSPADM

## 2019-09-09 RX ORDER — SODIUM CHLORIDE 0.9 % (FLUSH) 0.9 %
10 SYRINGE (ML) INJECTION PRN
Status: DISCONTINUED | OUTPATIENT
Start: 2019-09-09 | End: 2019-09-11 | Stop reason: HOSPADM

## 2019-09-09 RX ORDER — PANTOPRAZOLE SODIUM 40 MG/1
40 TABLET, DELAYED RELEASE ORAL
Status: DISCONTINUED | OUTPATIENT
Start: 2019-09-10 | End: 2019-09-11 | Stop reason: HOSPADM

## 2019-09-09 RX ORDER — ONDANSETRON 2 MG/ML
4 INJECTION INTRAMUSCULAR; INTRAVENOUS EVERY 6 HOURS PRN
Status: DISCONTINUED | OUTPATIENT
Start: 2019-09-09 | End: 2019-09-11 | Stop reason: HOSPADM

## 2019-09-09 RX ADMIN — PANCRELIPASE 2 CAPSULE: 60000; 12000; 38000 CAPSULE, DELAYED RELEASE PELLETS ORAL at 22:23

## 2019-09-09 RX ADMIN — DEXAMETHASONE 4 MG: 4 TABLET ORAL at 22:23

## 2019-09-09 RX ADMIN — SODIUM CHLORIDE 1000 ML: 9 INJECTION, SOLUTION INTRAVENOUS at 17:40

## 2019-09-09 RX ADMIN — SODIUM CHLORIDE, POTASSIUM CHLORIDE, SODIUM LACTATE AND CALCIUM CHLORIDE: 600; 310; 30; 20 INJECTION, SOLUTION INTRAVENOUS at 22:29

## 2019-09-09 RX ADMIN — Medication 10 ML: at 22:29

## 2019-09-09 RX ADMIN — ENOXAPARIN SODIUM 40 MG: 40 INJECTION SUBCUTANEOUS at 21:40

## 2019-09-09 ASSESSMENT — PAIN SCALES - GENERAL
PAINLEVEL_OUTOF10: 0
PAINLEVEL_OUTOF10: 0

## 2019-09-09 NOTE — DISCHARGE SUMMARY
Ht 5' 5.5\" (1.664 m)   Wt 175 lb (79.4 kg)   SpO2 96%   BMI 28.68 kg/m²   WBC, neutrophil %, neutrophil absolute count         · Lab Results   · Component · Value · Date   ·   · WBC · 7.5 · 09/01/2019   ·   · NEUTOPHILPCT · 78.2 · 09/01/2019   ·   · NEUTROABS · 5.88 · 09/01/2019   ·  Lactic acid No results found for: LACTSEPSIS  Cr         · Lab Results   · Component · Value · Date   ·   · CREATININE               9/3/2019-sUBJECTIVE: Daniel Salas is alert awake and cooperative; oriented ×3. Denies any chest pain dyspnea ; intermittent nausea; still has right upper quadrant pain unchanged. She states her diarrhea has now stopped.  present through the exam.  Temperature 98.3 respirations 18 pulse 101 blood pressure 104/55.  96% saturation on room air. Sodium 137 potassium 4.1 chloride 104 CO2 21 BUN 13 creatinine 0.8 glucose 76 magnesium 2.2 calcium 8.1 protein 5.9 albumin 3.1. Alkaline phosphatase 345 ALT 49  bilirubin 1.5 direct 1.4. Hemoglobin 10.1 WBC 7.7 platelets 803,811.     Patient presented to the ED on 8/31/2019 with complaints of nausea vomiting sweating chest pain shortness of breath. She was seen by Aleisha Kraft 1948 and H&P states she was short of breath x1 month had 79% saturation on room air. Patient underwent liver biopsy on 8/20/2019 with following results--     ====Sections of the liver core biopsies show complete  replacement of liver parenchyma by an extensively pigmented neoplasm with  spindle cell features. Immunohistochemical stains show the neoplastic  cells to be diffusely positive for MART 1 and S100 while negative for  pankeratin. This immunophenotype and histologic features are  characteristic of melanoma.     She originally was complaining of right upper abdominal discomfort along with elevation of transaminases and therefore underwent a liver biopsy. Since admission she was placed on azithromycin and ceftriaxone IV.   CTA of the chest on prior exam demonstrates a least 2 nodules,   possibly related to regenerating nodules versus FNH involving segment 7 of the   liver. These nodules in segment 7 are only vaguely identified on narrow window   width imaging series 2 image 33.   4. Transverse and descending colon is. Differential etiology includes   inflammatory, infectious and ischemic causes. Clinical correlation is   recommended. 5. Left breast focal calcification. Mammographic correlation is recommended. 6. Stable middle lobe atelectasis/partial consolidation with bronchiectasis. 7. Additional nonacute findings as described above.       She has been seen by hepatic oncology surgeon; as well as oncology. Those notes appreciated. Oncology suggested colonoscopy in view of findings on CT; I discussed the above with surgery  Since admission troponins have been negative x3; initial proBNP 603. Stool for C. difficile is negative.        9/4/2019-patient laying quietly in bed; no chest pain no dyspnea. Appetite fair. Patient hoping for discharge today. Temperature 98.1 respirations 16 pulse 93 blood pressure 122/60.  99% saturation on room air. Labs pending. Patient was seen yesterday by pulmonary; they did not believe patient has pneumonia. Rather, atelectasis due to compression from enlarged liver. Patient seen yesterday by surgical service; patient has declined colonoscopy. Patient seen today by oncology, they state patient does not want any further work-up will follow-up as outpatient. Chest x-ray repeated from yesterday, no evidence of pneumonia.       Instructions at discharge:  Med reconciliation completed  Prescriptions written  IV antibiotics have been stopped  Follow-up 1 week Dr. Aaron Patricio with oncology   Follow-up with surgical oncology  Follow-up with pulmonary      Condition at DISCHARGE : STABLE AND IMPROVED     Discharged to: Home, patient declined home health and/or skilled nursing    Discharge Instructions: Metastatic cancer to liver [facility]) metastatic cancer to the liver- left lobe liver biopsy Comparison: CT dated 8/16/2019. Anesthesia: Moderate sedation. Local anesthesia. Intraprocedural time: 20 minutes Medications: Midazolam 1 mg IV, fentanyl 50 mcg IV. Subcutaneous 2% lidocaine. Contrast: None. PROCEDURE DETAILS AND FINDINGS: Informed consent for the procedure was obtained from the patient. The procedure, as well as its risks, benefits, and alternatives, were explained. These risks include, but are not limited to, bleeding, injury to the liver, injury to or perforation of adjacent organs, and infection. The patient indicated understanding of what was discussed, was given the opportunity to ask questions, which were answered, and gave permission to proceed. The consent form was signed. The procedure was performed under moderate sedation. The patient's vital signs were visually displayed, and the patient was continuously monitored throughout the procedure. With the patient positioned supine, axial CT images through the liver were obtained, demonstrating a large left hepatic mass, as seen on the recent CT study available in PACS. A site was selected for biopsy, and a trajectory was planned based on the preliminary CT images. The skin was marked, and the patient was sterilely prepped and draped. Following a preprocedure timeout, the selected percutaneous entry site was locally anesthetized with subcutaneously administered lidocaine, and a small skin incision was made. Under CT guidance, a 17-gauge coaxial needle guide was advanced into the liver mass. Core biopsy of the lesion was performed through the coaxial needle guide with a Biopince 18-gauge biopsy needle. Three core specimens were obtained and immediately placed in formalin for submission to the pathology lab. To ensure hemostasis, Gelfoam mixed with sterile normal saline was injected through the coaxial needle guide as the needle was removed.  A sterile dressing axial CT images through the liver were obtained, demonstrating a large left hepatic mass, as seen on the recent CT study available in PACS. A site was selected for biopsy, and a trajectory was planned based on the preliminary CT images. The skin was marked, and the patient was sterilely prepped and draped. Following a preprocedure timeout, the selected percutaneous entry site was locally anesthetized with subcutaneously administered lidocaine, and a small skin incision was made. Under CT guidance, a 17-gauge coaxial needle guide was advanced into the liver mass. Core biopsy of the lesion was performed through the coaxial needle guide with a Vodio Labse 18-gauge biopsy needle. Three core specimens were obtained and immediately placed in formalin for submission to the pathology lab. To ensure hemostasis, Gelfoam mixed with sterile normal saline was injected through the coaxial needle guide as the needle was removed. A sterile dressing was applied. A postprocedure CT scan of the abdomen was performed, demonstrating post-biopsy changes. CT image-guided core biopsy of a liver mass without complication. Cta Pulmonary W Contrast    Result Date: 2019  Patient MRN:  [id number] : 1940 Age: [de-identified] years Gender: Female Order Date:  2019 7:45 PM EXAM: CTA PULMONARY W CONTRAST COMPARISON: None INDICATION:  CHEST PAIN, ACUTE, PULMONARY EMBOLISM SUSPECTED  TECHNIQUE: Axial thin section CT imaging was obtained from the apices of the lungs through the lung bases following a rapid bolus administration of IV contrast. Coronal and sagittal MIP images were obtained for aid in interpretation. Low-dose CT acquisition technique included one of the following options; 1. Automated exposure control, 2. Adjustment of mA and/or kV according to the patient's size or 3. Use of iterative reconstruction.  FINDINGS: Central and segmental pulmonary arteries demonstrate normal opacification without filling defects to suggest pulmonary

## 2019-09-09 NOTE — ED NOTES
Blood culture specimen obtained, per policy, from [address] by Qing Balderrama RN. Specimen one of two collected at this time.      Fadi Nash RN  09/09/19 1581

## 2019-09-09 NOTE — ED NOTES
Bed: HB  Expected date:   Expected time:   Means of arrival:   Comments:  Dorcas Zayas RN  09/09/19 1059

## 2019-09-10 LAB
ALBUMIN SERPL-MCNC: 2.8 G/DL (ref 3.5–5.2)
ALP BLD-CCNC: 420 U/L (ref 35–104)
ALT SERPL-CCNC: 78 U/L (ref 0–32)
ANION GAP SERPL CALCULATED.3IONS-SCNC: 18 MMOL/L (ref 7–16)
AST SERPL-CCNC: 252 U/L (ref 0–31)
BILIRUB SERPL-MCNC: 5.6 MG/DL (ref 0–1.2)
BUN BLDV-MCNC: 24 MG/DL (ref 8–23)
CALCIUM SERPL-MCNC: 7.9 MG/DL (ref 8.6–10.2)
CHLORIDE BLD-SCNC: 98 MMOL/L (ref 98–107)
CO2: 18 MMOL/L (ref 22–29)
CREAT SERPL-MCNC: 0.8 MG/DL (ref 0.5–1)
EKG ATRIAL RATE: 95 BPM
EKG P AXIS: 57 DEGREES
EKG P-R INTERVAL: 146 MS
EKG Q-T INTERVAL: 372 MS
EKG QRS DURATION: 82 MS
EKG QTC CALCULATION (BAZETT): 467 MS
EKG R AXIS: 15 DEGREES
EKG T AXIS: 46 DEGREES
EKG VENTRICULAR RATE: 95 BPM
GFR AFRICAN AMERICAN: >60
GFR NON-AFRICAN AMERICAN: >60 ML/MIN/1.73
GLUCOSE BLD-MCNC: 142 MG/DL (ref 74–99)
HCT VFR BLD CALC: 28.8 % (ref 34–48)
HEMOGLOBIN: 9 G/DL (ref 11.5–15.5)
MCH RBC QN AUTO: 30.6 PG (ref 26–35)
MCHC RBC AUTO-ENTMCNC: 31.3 % (ref 32–34.5)
MCV RBC AUTO: 98 FL (ref 80–99.9)
PDW BLD-RTO: 17.7 FL (ref 11.5–15)
PLATELET # BLD: 84 E9/L (ref 130–450)
PLATELET CONFIRMATION: NORMAL
PMV BLD AUTO: 10.3 FL (ref 7–12)
POTASSIUM REFLEX MAGNESIUM: 4.1 MMOL/L (ref 3.5–5)
RBC # BLD: 2.94 E12/L (ref 3.5–5.5)
SODIUM BLD-SCNC: 134 MMOL/L (ref 132–146)
TOTAL PROTEIN: 5.6 G/DL (ref 6.4–8.3)
WBC # BLD: 9.2 E9/L (ref 4.5–11.5)

## 2019-09-10 PROCEDURE — 93010 ELECTROCARDIOGRAM REPORT: CPT | Performed by: INTERNAL MEDICINE

## 2019-09-10 PROCEDURE — 2580000003 HC RX 258: Performed by: INTERNAL MEDICINE

## 2019-09-10 PROCEDURE — 85027 COMPLETE CBC AUTOMATED: CPT

## 2019-09-10 PROCEDURE — 36415 COLL VENOUS BLD VENIPUNCTURE: CPT

## 2019-09-10 PROCEDURE — 1200000000 HC SEMI PRIVATE

## 2019-09-10 PROCEDURE — 80053 COMPREHEN METABOLIC PANEL: CPT

## 2019-09-10 PROCEDURE — 6360000002 HC RX W HCPCS: Performed by: INTERNAL MEDICINE

## 2019-09-10 PROCEDURE — 99221 1ST HOSP IP/OBS SF/LOW 40: CPT | Performed by: EMERGENCY MEDICINE

## 2019-09-10 PROCEDURE — 6370000000 HC RX 637 (ALT 250 FOR IP): Performed by: INTERNAL MEDICINE

## 2019-09-10 RX ADMIN — DEXAMETHASONE 4 MG: 4 TABLET ORAL at 08:11

## 2019-09-10 RX ADMIN — PAROXETINE HYDROCHLORIDE 20 MG: 20 TABLET, FILM COATED ORAL at 08:11

## 2019-09-10 RX ADMIN — SODIUM CHLORIDE, POTASSIUM CHLORIDE, SODIUM LACTATE AND CALCIUM CHLORIDE: 600; 310; 30; 20 INJECTION, SOLUTION INTRAVENOUS at 05:41

## 2019-09-10 RX ADMIN — PANTOPRAZOLE SODIUM 40 MG: 40 TABLET, DELAYED RELEASE ORAL at 06:29

## 2019-09-10 RX ADMIN — LEVOTHYROXINE SODIUM 88 MCG: 88 TABLET ORAL at 06:29

## 2019-09-10 RX ADMIN — DEXAMETHASONE 4 MG: 4 TABLET ORAL at 16:49

## 2019-09-10 RX ADMIN — ENOXAPARIN SODIUM 40 MG: 40 INJECTION SUBCUTANEOUS at 08:21

## 2019-09-10 RX ADMIN — PANCRELIPASE 2 CAPSULE: 60000; 12000; 38000 CAPSULE, DELAYED RELEASE PELLETS ORAL at 08:11

## 2019-09-10 ASSESSMENT — PAIN - FUNCTIONAL ASSESSMENT: PAIN_FUNCTIONAL_ASSESSMENT: ACTIVITIES ARE NOT PREVENTED

## 2019-09-10 ASSESSMENT — PAIN SCALES - GENERAL
PAINLEVEL_OUTOF10: 0
PAINLEVEL_OUTOF10: 2
PAINLEVEL_OUTOF10: 0

## 2019-09-10 ASSESSMENT — PAIN DESCRIPTION - FREQUENCY: FREQUENCY: CONTINUOUS

## 2019-09-10 ASSESSMENT — ENCOUNTER SYMPTOMS
WHEEZING: 0
COUGH: 1
CONSTIPATION: 0
DIARRHEA: 0
SINUS PAIN: 0
VOMITING: 0
NAUSEA: 0
SHORTNESS OF BREATH: 1
ABDOMINAL PAIN: 0

## 2019-09-10 ASSESSMENT — PAIN DESCRIPTION - PAIN TYPE: TYPE: ACUTE PAIN

## 2019-09-10 ASSESSMENT — PAIN DESCRIPTION - DESCRIPTORS: DESCRIPTORS: ACHING;CONSTANT;DISCOMFORT

## 2019-09-10 ASSESSMENT — PAIN DESCRIPTION - ONSET: ONSET: ON-GOING

## 2019-09-10 ASSESSMENT — PAIN DESCRIPTION - LOCATION: LOCATION: ABDOMEN

## 2019-09-10 ASSESSMENT — PAIN DESCRIPTION - PROGRESSION: CLINICAL_PROGRESSION: NOT CHANGED

## 2019-09-10 NOTE — PROGRESS NOTES
Acute respiratory failure on admission to the ED; improved by the time she was on the floor. Initial SPO2 79 on room air in the ED; tachypnea and labored breathing on admission improved after transfer to the floor. Acute respiratory clear with hypoxia which improved.

## 2019-09-10 NOTE — H&P
Admission History and Physical                                                                                    Preston Stein MD, FACP                   Patient Name: Lizandro Fairbanks                   Age:  [de-identified] y.o. Gender:   female    CC: Extreme weakness    HPI: This patient had just been hospitalized at [facility].  She had presented with nausea vomiting sweating chest pain and shortness of breath . She had complained of progressive decline over a month. She  was discharged on   At that time she was found to have metastatic melanoma to the liver primary site unknown    Today the patient stated that she had seen oncology  He had a discussion with them  And she was discharged to follow-up for possible treatment outpatient    She states that she is [de-identified]years old now and does not want to go through chemotherapy  She states she understands the nature of her disease and wishes to have hospice and wants to go home    I have reviewed her previous chart          Past Medical History:   Diagnosis Date    Breast cancer ([address]     Diarrhea 9/3/2019    Hypothyroidism     Liver cancer ([address]     Malignant melanoma ([facility] 2019    LIVER BX; UNKNOWN ORIGIN    Rectal bleeding approx 14 started     light    SOB (shortness of breath)     Thrombocytopenia ([address] 9/3/2019       Past Surgical History:   Procedure Laterality Date    BREAST SURGERY Left approximately     lumpectomy    COLONOSCOPY      COLONOSCOPY  2014    LIVER BIOPSY  2019    MALIGNANT [address]       Family Status   Relation Name Status    Mother     Gasper Mendieta Father     Gasper Mendieta Brother      Brother          Prior to Admission medications    Medication Sig Start Date End Date Taking?  Authorizing Provider   PARoxetine (PAXIL) 20 MG tablet Take 20 mg by mouth every morning    Historical Provider, MD   Acetaminophen (TYLENOL perfusion assessed in all exremities. No cyanosis  Musculoskeletal: No unusual pain or swelling. Muscular strength intact. Neuro:   · Cranial nerves grossly intact. · Motor: Strength grossly normal. No focal weakness. · Sensory: grossly normal to touch. · Cerebellar testing was grossly normal  Mental status: Awake, alert, cognizant of person, place, time. Patient appears capable of directing self care   Mood: Normal and appropriate affect  Gait & balance: not assessed:     Labs     CBC:   Lab Results   Component Value Date    WBC 9.0 09/09/2019    RBC 3.16 09/09/2019    HGB 9.8 09/09/2019    HCT 30.4 09/09/2019     09/09/2019    MCV 96.2 09/09/2019     BMP:    Lab Results   Component Value Date     09/09/2019    K 5.2 09/09/2019    CL 97 09/09/2019    CO2 21 09/09/2019    BUN 26 09/09/2019    CREATININE 1.0 09/09/2019    GLUCOSE 81 09/09/2019    GLUCOSE 92 12/05/2011    CALCIUM 8.0 09/09/2019     Hepatic Function Panel:    Lab Results   Component Value Date    ALKPHOS 475 09/09/2019     09/09/2019    ALT 88 09/09/2019    PROT 6.2 09/09/2019    LABALBU 3.0 09/09/2019    LABALBU 4.4 12/05/2011    BILITOT 5.8 09/09/2019     Magnesium:    Lab Results   Component Value Date    MG 2.2 09/03/2019     Cardiac Enzymes:   Lab Results   Component Value Date    CKTOTAL 52 08/31/2019    TROPONINI 0.02 09/09/2019    TROPONINI <0.01 09/01/2019    TROPONINI <0.01 08/31/2019     LDH:    Lab Results   Component Value Date    LDH >2,500 09/01/2019     PT/INR:    Lab Results   Component Value Date    PROTIME 15.9 09/09/2019    INR 1.4 09/09/2019     BNP: No results for input(s): BNP in the last 72 hours.    TSH:   Lab Results   Component Value Date    TSH 1.060 09/09/2019      Cardiac Injury Profile:   Recent Labs     09/09/19  1455   TROPONINI 0.02      Lipid Profile:   Lab Results   Component Value Date    TRIG 103 01/18/2018    HDL 50 01/18/2018    LDLCALC 116 01/18/2018    CHOL 187 01/18/2018

## 2019-09-11 VITALS
RESPIRATION RATE: 20 BRPM | WEIGHT: 189.2 LBS | SYSTOLIC BLOOD PRESSURE: 106 MMHG | OXYGEN SATURATION: 96 % | TEMPERATURE: 97.4 F | BODY MASS INDEX: 31.52 KG/M2 | HEIGHT: 65 IN | DIASTOLIC BLOOD PRESSURE: 58 MMHG | HEART RATE: 96 BPM

## 2019-09-11 LAB
ORGANISM: ABNORMAL
URINE CULTURE, ROUTINE: ABNORMAL

## 2019-09-11 PROCEDURE — 2580000003 HC RX 258: Performed by: INTERNAL MEDICINE

## 2019-09-11 PROCEDURE — 6370000000 HC RX 637 (ALT 250 FOR IP): Performed by: INTERNAL MEDICINE

## 2019-09-11 PROCEDURE — 6360000002 HC RX W HCPCS: Performed by: INTERNAL MEDICINE

## 2019-09-11 RX ORDER — PANTOPRAZOLE SODIUM 40 MG/1
40 TABLET, DELAYED RELEASE ORAL
Qty: 30 TABLET | Refills: 3 | Status: SHIPPED | OUTPATIENT
Start: 2019-09-12

## 2019-09-11 RX ADMIN — PANTOPRAZOLE SODIUM 40 MG: 40 TABLET, DELAYED RELEASE ORAL at 05:36

## 2019-09-11 RX ADMIN — LEVOTHYROXINE SODIUM 88 MCG: 88 TABLET ORAL at 05:36

## 2019-09-11 RX ADMIN — PAROXETINE HYDROCHLORIDE 20 MG: 20 TABLET, FILM COATED ORAL at 08:51

## 2019-09-11 RX ADMIN — DEXAMETHASONE 4 MG: 4 TABLET ORAL at 08:51

## 2019-09-11 RX ADMIN — ENOXAPARIN SODIUM 40 MG: 40 INJECTION SUBCUTANEOUS at 08:51

## 2019-09-11 RX ADMIN — SODIUM CHLORIDE, POTASSIUM CHLORIDE, SODIUM LACTATE AND CALCIUM CHLORIDE: 600; 310; 30; 20 INJECTION, SOLUTION INTRAVENOUS at 05:36

## 2019-09-11 NOTE — DISCHARGE SUMMARY
Exam:  unchanged    Disposition: home with Hospice    Patient Instructions:   REFER TO AVR or BALDEMAR document    Signed:  Leia Arreguin  [facility]  [employer]  9/11/2019, 11:54 AM

## 2019-09-11 NOTE — PROGRESS NOTES
DME will also be delivering oxygen concentrator for PRN use- update received from Reza Jose.
allergies. Family Goal: Comfort care    Meeting held with Patient and , Alfredo    Discharge Plan:  Discharge Disposition; [facility] Name: N/A    Referral received. Chart reviewed. Visit made to unit. Spoke with ELANA Cano and received update. Patient would like information regarding hospice care. Met with patient and  in the room. Explained hospice philosophy and no longer pursuing any further aggressive treatment. Focusing on comfort care only. Explained hospice benefit and reviewed all levels of care including the hospice house for short term symptom management. Once symptoms are managed patient would transfer to a routine level of care either home or ECF with hospice. Explained room and board would be private pay at the facility. Discussed Transition Program at the hospice house. Discussed roles and frequency visits of skilled nursing, personal care team,ELANA,  and non medical volunteers.  stated he will discuss options with patient and would like a follow up visit tomorrow.  expressed that he is leaning towards hospice vs HHC. He stated he needed a hospital bed for patient at home.  will be in the hospital tomorrow around 1030 and would like a visit. Explained that I will have a nurse liaison follow up tomorrow. Emotional support and active listening provided. Brochure given. Updated charge nurse. HOT plan:  1. Will follow up tomorrow and see if patient and  have decided to discharge home with hospice vs HHC. 2. Hospice is not managing any aspect of the patient's care presently. 3. Please call [facility] with any questions at [phone].     Electronically signed by Jag Ambriz RN on 9/10/2019 at 4:40 PM

## 2019-09-14 LAB
BLOOD CULTURE, ROUTINE: NORMAL
CULTURE, BLOOD 2: NORMAL

## 2020-08-07 VITALS
BODY MASS INDEX: 27.8 KG/M2 | WEIGHT: 173 LBS | SYSTOLIC BLOOD PRESSURE: 114 MMHG | HEIGHT: 66 IN | DIASTOLIC BLOOD PRESSURE: 62 MMHG

## 2020-08-07 RX ORDER — ACETAMINOPHEN 160 MG
1 TABLET,DISINTEGRATING ORAL DAILY
COMMUNITY

## 2020-08-07 RX ORDER — ROSUVASTATIN CALCIUM 5 MG/1
5 TABLET, COATED ORAL
COMMUNITY